# Patient Record
Sex: MALE | Race: WHITE
[De-identification: names, ages, dates, MRNs, and addresses within clinical notes are randomized per-mention and may not be internally consistent; named-entity substitution may affect disease eponyms.]

---

## 2017-01-05 PROBLEM — Z00.00 ENCOUNTER FOR PREVENTIVE HEALTH EXAMINATION: Status: ACTIVE | Noted: 2017-01-05

## 2017-03-08 ENCOUNTER — TRANSCRIPTION ENCOUNTER (OUTPATIENT)
Age: 37
End: 2017-03-08

## 2017-04-24 ENCOUNTER — RESULT REVIEW (OUTPATIENT)
Age: 37
End: 2017-04-24

## 2017-04-25 ENCOUNTER — OUTPATIENT (OUTPATIENT)
Dept: OUTPATIENT SERVICES | Facility: HOSPITAL | Age: 37
LOS: 1 days | Discharge: HOME | End: 2017-04-25
Payer: OTHER GOVERNMENT

## 2017-04-25 VITALS
SYSTOLIC BLOOD PRESSURE: 131 MMHG | RESPIRATION RATE: 17 BRPM | WEIGHT: 259.93 LBS | TEMPERATURE: 98 F | HEART RATE: 86 BPM | OXYGEN SATURATION: 97 % | HEIGHT: 73 IN | DIASTOLIC BLOOD PRESSURE: 81 MMHG

## 2017-04-25 DIAGNOSIS — Z98.890 OTHER SPECIFIED POSTPROCEDURAL STATES: Chronic | ICD-10-CM

## 2017-04-25 PROCEDURE — 88312 SPECIAL STAINS GROUP 1: CPT | Mod: 26

## 2017-04-25 PROCEDURE — 88305 TISSUE EXAM BY PATHOLOGIST: CPT | Mod: 26

## 2017-04-25 PROCEDURE — 88313 SPECIAL STAINS GROUP 2: CPT | Mod: 26

## 2017-04-25 RX ORDER — SODIUM CHLORIDE 9 MG/ML
1000 INJECTION INTRAMUSCULAR; INTRAVENOUS; SUBCUTANEOUS
Qty: 0 | Refills: 0 | Status: DISCONTINUED | OUTPATIENT
Start: 2017-04-25 | End: 2017-05-10

## 2017-04-25 NOTE — ASU PATIENT PROFILE, ADULT - PMH
Asthma  childhood - pt states does not have aymore  Pancreatitis due to common bile duct stone  January 2017  Pneumonia  1 month ago

## 2017-04-26 LAB — SURGICAL PATHOLOGY FINAL REPORT - CH: SIGNIFICANT CHANGE UP

## 2017-04-27 ENCOUNTER — EMERGENCY (EMERGENCY)
Facility: HOSPITAL | Age: 37
LOS: 0 days | Discharge: HOME | End: 2017-04-28
Attending: EMERGENCY MEDICINE | Admitting: EMERGENCY MEDICINE
Payer: OTHER GOVERNMENT

## 2017-04-27 VITALS
DIASTOLIC BLOOD PRESSURE: 102 MMHG | TEMPERATURE: 99 F | RESPIRATION RATE: 18 BRPM | HEART RATE: 95 BPM | SYSTOLIC BLOOD PRESSURE: 148 MMHG | WEIGHT: 255.07 LBS | OXYGEN SATURATION: 99 %

## 2017-04-27 DIAGNOSIS — R10.13 EPIGASTRIC PAIN: ICD-10-CM

## 2017-04-27 DIAGNOSIS — Z98.890 OTHER SPECIFIED POSTPROCEDURAL STATES: Chronic | ICD-10-CM

## 2017-04-27 DIAGNOSIS — K29.50 UNSPECIFIED CHRONIC GASTRITIS WITHOUT BLEEDING: ICD-10-CM

## 2017-04-27 LAB
ALBUMIN SERPL ELPH-MCNC: 4.1 G/DL — SIGNIFICANT CHANGE UP (ref 3.3–5)
ALP SERPL-CCNC: 67 U/L — SIGNIFICANT CHANGE UP (ref 40–120)
ALT FLD-CCNC: 32 U/L — SIGNIFICANT CHANGE UP (ref 12–78)
ANION GAP SERPL CALC-SCNC: 7 MMOL/L — SIGNIFICANT CHANGE UP (ref 5–17)
APPEARANCE UR: CLEAR — SIGNIFICANT CHANGE UP
AST SERPL-CCNC: 17 U/L — SIGNIFICANT CHANGE UP (ref 15–37)
BASOPHILS # BLD AUTO: 0.1 K/UL — SIGNIFICANT CHANGE UP (ref 0–0.2)
BASOPHILS NFR BLD AUTO: 1.4 % — SIGNIFICANT CHANGE UP (ref 0–2)
BILIRUB SERPL-MCNC: 0.2 MG/DL — SIGNIFICANT CHANGE UP (ref 0.2–1.2)
BILIRUB UR-MCNC: NEGATIVE — SIGNIFICANT CHANGE UP
BUN SERPL-MCNC: 12 MG/DL — SIGNIFICANT CHANGE UP (ref 7–23)
CALCIUM SERPL-MCNC: 9.4 MG/DL — SIGNIFICANT CHANGE UP (ref 8.5–10.1)
CHLORIDE SERPL-SCNC: 105 MMOL/L — SIGNIFICANT CHANGE UP (ref 96–108)
CO2 SERPL-SCNC: 28 MMOL/L — SIGNIFICANT CHANGE UP (ref 22–31)
COLOR SPEC: YELLOW — SIGNIFICANT CHANGE UP
CREAT SERPL-MCNC: 1.01 MG/DL — SIGNIFICANT CHANGE UP (ref 0.5–1.3)
DIFF PNL FLD: NEGATIVE — SIGNIFICANT CHANGE UP
EOSINOPHIL # BLD AUTO: 0.2 K/UL — SIGNIFICANT CHANGE UP (ref 0–0.5)
EOSINOPHIL NFR BLD AUTO: 2.8 % — SIGNIFICANT CHANGE UP (ref 0–6)
GLUCOSE SERPL-MCNC: 137 MG/DL — HIGH (ref 70–99)
GLUCOSE UR QL: NEGATIVE MG/DL — SIGNIFICANT CHANGE UP
HCT VFR BLD CALC: 40.5 % — SIGNIFICANT CHANGE UP (ref 39–50)
HGB BLD-MCNC: 14.3 G/DL — SIGNIFICANT CHANGE UP (ref 13–17)
KETONES UR-MCNC: NEGATIVE — SIGNIFICANT CHANGE UP
LEUKOCYTE ESTERASE UR-ACNC: NEGATIVE — SIGNIFICANT CHANGE UP
LIDOCAIN IGE QN: 64 U/L — LOW (ref 73–393)
LYMPHOCYTES # BLD AUTO: 3 K/UL — SIGNIFICANT CHANGE UP (ref 1–3.3)
LYMPHOCYTES # BLD AUTO: 40.5 % — SIGNIFICANT CHANGE UP (ref 13–44)
MCHC RBC-ENTMCNC: 31.1 PG — SIGNIFICANT CHANGE UP (ref 27–34)
MCHC RBC-ENTMCNC: 35.3 GM/DL — SIGNIFICANT CHANGE UP (ref 32–36)
MCV RBC AUTO: 88 FL — SIGNIFICANT CHANGE UP (ref 80–100)
MONOCYTES # BLD AUTO: 0.5 K/UL — SIGNIFICANT CHANGE UP (ref 0–0.9)
MONOCYTES NFR BLD AUTO: 6.3 % — SIGNIFICANT CHANGE UP (ref 2–14)
NEUTROPHILS # BLD AUTO: 3.7 K/UL — SIGNIFICANT CHANGE UP (ref 1.8–7.4)
NEUTROPHILS NFR BLD AUTO: 49 % — SIGNIFICANT CHANGE UP (ref 43–77)
NITRITE UR-MCNC: NEGATIVE — SIGNIFICANT CHANGE UP
PH UR: 6 — SIGNIFICANT CHANGE UP (ref 5–8)
PLATELET # BLD AUTO: 263 K/UL — SIGNIFICANT CHANGE UP (ref 150–400)
POTASSIUM SERPL-MCNC: 3.9 MMOL/L — SIGNIFICANT CHANGE UP (ref 3.5–5.3)
POTASSIUM SERPL-SCNC: 3.9 MMOL/L — SIGNIFICANT CHANGE UP (ref 3.5–5.3)
PROT SERPL-MCNC: 7.7 GM/DL — SIGNIFICANT CHANGE UP (ref 6–8.3)
PROT UR-MCNC: NEGATIVE MG/DL — SIGNIFICANT CHANGE UP
RBC # BLD: 4.6 M/UL — SIGNIFICANT CHANGE UP (ref 4.2–5.8)
RBC # FLD: 11.8 % — SIGNIFICANT CHANGE UP (ref 10.3–14.5)
SODIUM SERPL-SCNC: 140 MMOL/L — SIGNIFICANT CHANGE UP (ref 135–145)
SP GR SPEC: 1.01 — SIGNIFICANT CHANGE UP (ref 1.01–1.02)
UROBILINOGEN FLD QL: NEGATIVE MG/DL — SIGNIFICANT CHANGE UP
WBC # BLD: 7.5 K/UL — SIGNIFICANT CHANGE UP (ref 3.8–10.5)
WBC # FLD AUTO: 7.5 K/UL — SIGNIFICANT CHANGE UP (ref 3.8–10.5)

## 2017-04-27 PROCEDURE — 71020: CPT | Mod: 26

## 2017-04-27 PROCEDURE — 76700 US EXAM ABDOM COMPLETE: CPT | Mod: 26

## 2017-04-27 PROCEDURE — 99053 MED SERV 10PM-8AM 24 HR FAC: CPT

## 2017-04-27 PROCEDURE — 99284 EMERGENCY DEPT VISIT MOD MDM: CPT | Mod: 25

## 2017-04-27 RX ORDER — SODIUM CHLORIDE 9 MG/ML
1000 INJECTION INTRAMUSCULAR; INTRAVENOUS; SUBCUTANEOUS ONCE
Qty: 0 | Refills: 0 | Status: COMPLETED | OUTPATIENT
Start: 2017-04-27 | End: 2017-04-27

## 2017-04-27 RX ORDER — MORPHINE SULFATE 50 MG/1
6 CAPSULE, EXTENDED RELEASE ORAL ONCE
Qty: 0 | Refills: 0 | Status: DISCONTINUED | OUTPATIENT
Start: 2017-04-27 | End: 2017-04-27

## 2017-04-27 RX ORDER — MORPHINE SULFATE 50 MG/1
4 CAPSULE, EXTENDED RELEASE ORAL ONCE
Qty: 0 | Refills: 0 | Status: DISCONTINUED | OUTPATIENT
Start: 2017-04-27 | End: 2017-04-27

## 2017-04-27 RX ORDER — ONDANSETRON 8 MG/1
4 TABLET, FILM COATED ORAL ONCE
Qty: 0 | Refills: 0 | Status: COMPLETED | OUTPATIENT
Start: 2017-04-27 | End: 2017-04-27

## 2017-04-27 RX ORDER — KETOROLAC TROMETHAMINE 30 MG/ML
30 SYRINGE (ML) INJECTION ONCE
Qty: 0 | Refills: 0 | Status: DISCONTINUED | OUTPATIENT
Start: 2017-04-27 | End: 2017-04-27

## 2017-04-27 RX ORDER — SODIUM CHLORIDE 9 MG/ML
3 INJECTION INTRAMUSCULAR; INTRAVENOUS; SUBCUTANEOUS ONCE
Qty: 0 | Refills: 0 | Status: COMPLETED | OUTPATIENT
Start: 2017-04-27 | End: 2017-04-27

## 2017-04-27 RX ORDER — SUCRALFATE 1 G
1 TABLET ORAL ONCE
Qty: 0 | Refills: 0 | Status: COMPLETED | OUTPATIENT
Start: 2017-04-27 | End: 2017-04-27

## 2017-04-27 RX ADMIN — Medication 30 MILLIGRAM(S): at 23:34

## 2017-04-27 RX ADMIN — Medication 1 GRAM(S): at 23:20

## 2017-04-27 RX ADMIN — SODIUM CHLORIDE 1000 MILLILITER(S): 9 INJECTION INTRAMUSCULAR; INTRAVENOUS; SUBCUTANEOUS at 21:45

## 2017-04-27 RX ADMIN — MORPHINE SULFATE 4 MILLIGRAM(S): 50 CAPSULE, EXTENDED RELEASE ORAL at 22:27

## 2017-04-27 RX ADMIN — Medication 30 MILLIGRAM(S): at 22:27

## 2017-04-27 RX ADMIN — ONDANSETRON 4 MILLIGRAM(S): 8 TABLET, FILM COATED ORAL at 21:45

## 2017-04-27 RX ADMIN — SODIUM CHLORIDE 3 MILLILITER(S): 9 INJECTION INTRAMUSCULAR; INTRAVENOUS; SUBCUTANEOUS at 21:45

## 2017-04-27 RX ADMIN — MORPHINE SULFATE 4 MILLIGRAM(S): 50 CAPSULE, EXTENDED RELEASE ORAL at 21:45

## 2017-04-27 RX ADMIN — MORPHINE SULFATE 6 MILLIGRAM(S): 50 CAPSULE, EXTENDED RELEASE ORAL at 23:34

## 2017-04-27 NOTE — ED STATDOCS - GASTROINTESTINAL, MLM
+RLQ tenderness. abdomen soft, non-distended. Bowel sounds present. +epigastric, RLQ tenderness. abdomen soft, non-distended. Bowel sounds present. negative dumont's, no CVAT.

## 2017-04-27 NOTE — ED STATDOCS - OBJECTIVE STATEMENT
37 yo M with hx of HTN, pancreatitis in December, kidney stones, gallstones, presents for flank pain, abd pain, nausea. Pt states it feels like a kidney stone. Pain is worse with eating. Denies diarrhea. He had a recent follow-up EUS showing gallstones and sludge. Pt recently finished steroids for shoulder pain and z-misael for pneumonia diagnosed 2 weeks ago. GI Dr. Rodriguez.

## 2017-04-27 NOTE — ED STATDOCS - MEDICAL DECISION MAKING DETAILS
US to r/o acute rey and CT to r/o kidney stones US to r/o acute rey and CT to r/o kidney stones planned with discharge to see his doctors if these studies are negative and if pain is controlled

## 2017-04-27 NOTE — ED STATDOCS - CARE PLAN
Principal Discharge DX:	Epigastric pain  Secondary Diagnosis:	Chronic gastritis without bleeding, unspecified gastritis type

## 2017-04-27 NOTE — ED STATDOCS - PROGRESS NOTE DETAILS
Patients pain improved, walking around the ER; will contact his GI doctors tomorrow for outpatient management and evaluation.

## 2017-04-27 NOTE — ED STATDOCS - NS ED MD SCRIBE ATTENDING SCRIBE SECTIONS
PAST MEDICAL/SURGICAL/SOCIAL HISTORY/PHYSICAL EXAM/DISPOSITION/HISTORY OF PRESENT ILLNESS/RESULTS/REVIEW OF SYSTEMS/PROGRESS NOTE

## 2017-04-27 NOTE — ED STATDOCS - DETAILS:
I, Cammy Flores, performed the initial face to face bedside interview with this patient regarding history of present illness, review of symptoms and relevant past medical, social and family history.  I completed an independent physical examination.  I was the initial provider who evaluated this patient.   The history, relevant review of systems, past medical and surgical history, medical decision making, and physical examination was documented by the scribe in my presence and I attest to the accuracy of the documentation.

## 2017-04-27 NOTE — ED ADULT NURSE NOTE - OBJECTIVE STATEMENT
Pt presents to ED c/o right flank pain, nausea and chills x 1 day. Pt has hx of kidney stones and gallstones. Denies burning on urination, blood in urine, and vomiting. Examined by MD Flores. Will continue to monitor.

## 2017-04-28 PROCEDURE — 74176 CT ABD & PELVIS W/O CONTRAST: CPT | Mod: 26

## 2017-05-03 DIAGNOSIS — K22.8 OTHER SPECIFIED DISEASES OF ESOPHAGUS: ICD-10-CM

## 2017-05-03 DIAGNOSIS — Z87.891 PERSONAL HISTORY OF NICOTINE DEPENDENCE: ICD-10-CM

## 2017-05-03 DIAGNOSIS — K85.90 ACUTE PANCREATITIS WITHOUT NECROSIS OR INFECTION, UNSPECIFIED: ICD-10-CM

## 2017-05-03 DIAGNOSIS — E78.1 PURE HYPERGLYCERIDEMIA: ICD-10-CM

## 2017-05-03 DIAGNOSIS — I10 ESSENTIAL (PRIMARY) HYPERTENSION: ICD-10-CM

## 2017-05-03 DIAGNOSIS — E78.5 HYPERLIPIDEMIA, UNSPECIFIED: ICD-10-CM

## 2017-05-03 DIAGNOSIS — K29.70 GASTRITIS, UNSPECIFIED, WITHOUT BLEEDING: ICD-10-CM

## 2017-05-03 DIAGNOSIS — K20.8 OTHER ESOPHAGITIS: ICD-10-CM

## 2017-10-23 RX ORDER — AMOXICILLIN 250 MG/5ML
1 SUSPENSION, RECONSTITUTED, ORAL (ML) ORAL
Qty: 0 | Refills: 0 | COMMUNITY
Start: 2017-10-23 | End: 2017-11-01

## 2017-11-03 ENCOUNTER — INPATIENT (INPATIENT)
Facility: HOSPITAL | Age: 37
LOS: 1 days | Discharge: HOME | End: 2017-11-05
Attending: FAMILY MEDICINE | Admitting: FAMILY MEDICINE
Payer: OTHER GOVERNMENT

## 2017-11-03 VITALS — HEIGHT: 73 IN | WEIGHT: 270.07 LBS

## 2017-11-03 DIAGNOSIS — Z98.890 OTHER SPECIFIED POSTPROCEDURAL STATES: Chronic | ICD-10-CM

## 2017-11-03 LAB
ADD ON TEST-SPECIMEN IN LAB: SIGNIFICANT CHANGE UP
ALBUMIN SERPL ELPH-MCNC: 3.6 G/DL — SIGNIFICANT CHANGE UP (ref 3.3–5)
ALP SERPL-CCNC: 142 U/L — HIGH (ref 40–120)
ALT FLD-CCNC: 918 U/L — HIGH (ref 12–78)
ANION GAP SERPL CALC-SCNC: 13 MMOL/L — SIGNIFICANT CHANGE UP (ref 5–17)
APPEARANCE UR: CLEAR — SIGNIFICANT CHANGE UP
APTT BLD: 30.3 SEC — SIGNIFICANT CHANGE UP (ref 27.5–37.4)
AST SERPL-CCNC: 741 U/L — HIGH (ref 15–37)
BACTERIA # UR AUTO: NEGATIVE — SIGNIFICANT CHANGE UP
BASOPHILS # BLD AUTO: 0.1 K/UL — SIGNIFICANT CHANGE UP (ref 0–0.2)
BASOPHILS NFR BLD AUTO: 0.5 % — SIGNIFICANT CHANGE UP (ref 0–2)
BILIRUB SERPL-MCNC: 1.7 MG/DL — HIGH (ref 0.2–1.2)
BILIRUB UR-MCNC: NEGATIVE — SIGNIFICANT CHANGE UP
BLD GP AB SCN SERPL QL: SIGNIFICANT CHANGE UP
BUN SERPL-MCNC: 14 MG/DL — SIGNIFICANT CHANGE UP (ref 7–23)
CALCIUM SERPL-MCNC: 8.9 MG/DL — SIGNIFICANT CHANGE UP (ref 8.5–10.1)
CHLORIDE SERPL-SCNC: 96 MMOL/L — SIGNIFICANT CHANGE UP (ref 96–108)
CO2 SERPL-SCNC: 21 MMOL/L — LOW (ref 22–31)
COLOR SPEC: YELLOW — SIGNIFICANT CHANGE UP
CREAT SERPL-MCNC: 1.03 MG/DL — SIGNIFICANT CHANGE UP (ref 0.5–1.3)
DIFF PNL FLD: NEGATIVE — SIGNIFICANT CHANGE UP
EOSINOPHIL # BLD AUTO: 0 K/UL — SIGNIFICANT CHANGE UP (ref 0–0.5)
EOSINOPHIL NFR BLD AUTO: 0.3 % — SIGNIFICANT CHANGE UP (ref 0–6)
EPI CELLS # UR: SIGNIFICANT CHANGE UP
GLUCOSE SERPL-MCNC: 125 MG/DL — HIGH (ref 70–99)
GLUCOSE UR QL: NEGATIVE MG/DL — SIGNIFICANT CHANGE UP
HCT VFR BLD CALC: 48.2 % — SIGNIFICANT CHANGE UP (ref 39–50)
HGB BLD-MCNC: 16.4 G/DL — SIGNIFICANT CHANGE UP (ref 13–17)
INR BLD: 1.13 RATIO — SIGNIFICANT CHANGE UP (ref 0.88–1.16)
KETONES UR-MCNC: NEGATIVE — SIGNIFICANT CHANGE UP
LEUKOCYTE ESTERASE UR-ACNC: NEGATIVE — SIGNIFICANT CHANGE UP
LIDOCAIN IGE QN: 414 U/L — HIGH (ref 73–393)
LYMPHOCYTES # BLD AUTO: 17.4 % — SIGNIFICANT CHANGE UP (ref 13–44)
LYMPHOCYTES # BLD AUTO: 2.7 K/UL — SIGNIFICANT CHANGE UP (ref 1–3.3)
MCHC RBC-ENTMCNC: 33.1 PG — SIGNIFICANT CHANGE UP (ref 27–34)
MCHC RBC-ENTMCNC: 33.9 GM/DL — SIGNIFICANT CHANGE UP (ref 32–36)
MCV RBC AUTO: 97.5 FL — SIGNIFICANT CHANGE UP (ref 80–100)
MONOCYTES # BLD AUTO: 0.8 K/UL — SIGNIFICANT CHANGE UP (ref 0–0.9)
MONOCYTES NFR BLD AUTO: 5.1 % — SIGNIFICANT CHANGE UP (ref 2–14)
NEUTROPHILS # BLD AUTO: 12 K/UL — HIGH (ref 1.8–7.4)
NEUTROPHILS NFR BLD AUTO: 76.8 % — SIGNIFICANT CHANGE UP (ref 43–77)
NITRITE UR-MCNC: NEGATIVE — SIGNIFICANT CHANGE UP
PH UR: 6.5 — SIGNIFICANT CHANGE UP (ref 5–8)
PLATELET # BLD AUTO: 277 K/UL — SIGNIFICANT CHANGE UP (ref 150–400)
POTASSIUM SERPL-MCNC: 4.3 MMOL/L — SIGNIFICANT CHANGE UP (ref 3.5–5.3)
POTASSIUM SERPL-SCNC: 4.3 MMOL/L — SIGNIFICANT CHANGE UP (ref 3.5–5.3)
PROT SERPL-MCNC: 7.6 GM/DL — SIGNIFICANT CHANGE UP (ref 6–8.3)
PROT UR-MCNC: 15 MG/DL
PROTHROM AB SERPL-ACNC: 12.2 SEC — SIGNIFICANT CHANGE UP (ref 9.8–12.7)
RBC # BLD: 4.94 M/UL — SIGNIFICANT CHANGE UP (ref 4.2–5.8)
RBC # FLD: 13.4 % — SIGNIFICANT CHANGE UP (ref 10.3–14.5)
RBC CASTS # UR COMP ASSIST: NEGATIVE /HPF — SIGNIFICANT CHANGE UP (ref 0–4)
SODIUM SERPL-SCNC: 130 MMOL/L — LOW (ref 135–145)
SP GR SPEC: 1.01 — SIGNIFICANT CHANGE UP (ref 1.01–1.02)
TYPE + AB SCN PNL BLD: SIGNIFICANT CHANGE UP
UROBILINOGEN FLD QL: 1 MG/DL
WBC # BLD: 15.6 K/UL — HIGH (ref 3.8–10.5)
WBC # FLD AUTO: 15.6 K/UL — HIGH (ref 3.8–10.5)
WBC UR QL: SIGNIFICANT CHANGE UP

## 2017-11-03 PROCEDURE — 99285 EMERGENCY DEPT VISIT HI MDM: CPT

## 2017-11-03 PROCEDURE — 76705 ECHO EXAM OF ABDOMEN: CPT | Mod: 26

## 2017-11-03 PROCEDURE — 93010 ELECTROCARDIOGRAM REPORT: CPT

## 2017-11-03 PROCEDURE — 74177 CT ABD & PELVIS W/CONTRAST: CPT | Mod: 26

## 2017-11-03 RX ORDER — METOPROLOL TARTRATE 50 MG
1 TABLET ORAL
Qty: 0 | Refills: 0 | COMMUNITY

## 2017-11-03 RX ORDER — SODIUM CHLORIDE 9 MG/ML
3 INJECTION INTRAMUSCULAR; INTRAVENOUS; SUBCUTANEOUS ONCE
Qty: 0 | Refills: 0 | Status: COMPLETED | OUTPATIENT
Start: 2017-11-03 | End: 2017-11-03

## 2017-11-03 RX ORDER — GEMFIBROZIL 600 MG
1 TABLET ORAL
Qty: 0 | Refills: 0 | COMMUNITY

## 2017-11-03 RX ORDER — FLUTICASONE PROPIONATE 50 MCG
1 SPRAY, SUSPENSION NASAL
Qty: 0 | Refills: 0 | COMMUNITY

## 2017-11-03 RX ORDER — MORPHINE SULFATE 50 MG/1
4 CAPSULE, EXTENDED RELEASE ORAL ONCE
Qty: 0 | Refills: 0 | Status: DISCONTINUED | OUTPATIENT
Start: 2017-11-03 | End: 2017-11-03

## 2017-11-03 RX ORDER — SODIUM CHLORIDE 9 MG/ML
3000 INJECTION INTRAMUSCULAR; INTRAVENOUS; SUBCUTANEOUS
Qty: 0 | Refills: 0 | Status: DISCONTINUED | OUTPATIENT
Start: 2017-11-03 | End: 2017-11-04

## 2017-11-03 RX ORDER — AMLODIPINE BESYLATE 2.5 MG/1
1 TABLET ORAL
Qty: 0 | Refills: 0 | COMMUNITY

## 2017-11-03 RX ORDER — THIAMINE MONONITRATE (VIT B1) 100 MG
100 TABLET ORAL DAILY
Qty: 0 | Refills: 0 | Status: DISCONTINUED | OUTPATIENT
Start: 2017-11-03 | End: 2017-11-05

## 2017-11-03 RX ORDER — HYDROMORPHONE HYDROCHLORIDE 2 MG/ML
1 INJECTION INTRAMUSCULAR; INTRAVENOUS; SUBCUTANEOUS ONCE
Qty: 0 | Refills: 0 | Status: DISCONTINUED | OUTPATIENT
Start: 2017-11-03 | End: 2017-11-03

## 2017-11-03 RX ORDER — HEPARIN SODIUM 5000 [USP'U]/ML
5000 INJECTION INTRAVENOUS; SUBCUTANEOUS EVERY 12 HOURS
Qty: 0 | Refills: 0 | Status: DISCONTINUED | OUTPATIENT
Start: 2017-11-03 | End: 2017-11-05

## 2017-11-03 RX ORDER — FOLIC ACID 0.8 MG
1 TABLET ORAL DAILY
Qty: 0 | Refills: 0 | Status: DISCONTINUED | OUTPATIENT
Start: 2017-11-03 | End: 2017-11-05

## 2017-11-03 RX ORDER — ALPRAZOLAM 0.25 MG
1 TABLET ORAL
Qty: 4 | Refills: 0 | OUTPATIENT
Start: 2017-11-03

## 2017-11-03 RX ORDER — GUAIFENESIN/PHENYLPROPANOLAMIN
450 EXPECTORANT ORAL
Qty: 0 | Refills: 0 | COMMUNITY

## 2017-11-03 RX ORDER — SODIUM CHLORIDE 9 MG/ML
1000 INJECTION INTRAMUSCULAR; INTRAVENOUS; SUBCUTANEOUS ONCE
Qty: 0 | Refills: 0 | Status: COMPLETED | OUTPATIENT
Start: 2017-11-03 | End: 2017-11-03

## 2017-11-03 RX ORDER — ONDANSETRON 8 MG/1
4 TABLET, FILM COATED ORAL EVERY 6 HOURS
Qty: 0 | Refills: 0 | Status: DISCONTINUED | OUTPATIENT
Start: 2017-11-03 | End: 2017-11-05

## 2017-11-03 RX ORDER — PANTOPRAZOLE SODIUM 20 MG/1
1 TABLET, DELAYED RELEASE ORAL
Qty: 0 | Refills: 0 | COMMUNITY

## 2017-11-03 RX ADMIN — MORPHINE SULFATE 4 MILLIGRAM(S): 50 CAPSULE, EXTENDED RELEASE ORAL at 17:48

## 2017-11-03 RX ADMIN — HYDROMORPHONE HYDROCHLORIDE 1 MILLIGRAM(S): 2 INJECTION INTRAMUSCULAR; INTRAVENOUS; SUBCUTANEOUS at 21:45

## 2017-11-03 RX ADMIN — MORPHINE SULFATE 4 MILLIGRAM(S): 50 CAPSULE, EXTENDED RELEASE ORAL at 19:00

## 2017-11-03 RX ADMIN — SODIUM CHLORIDE 3 MILLILITER(S): 9 INJECTION INTRAMUSCULAR; INTRAVENOUS; SUBCUTANEOUS at 17:50

## 2017-11-03 RX ADMIN — SODIUM CHLORIDE 1000 MILLILITER(S): 9 INJECTION INTRAMUSCULAR; INTRAVENOUS; SUBCUTANEOUS at 17:50

## 2017-11-03 RX ADMIN — SODIUM CHLORIDE 1000 MILLILITER(S): 9 INJECTION INTRAMUSCULAR; INTRAVENOUS; SUBCUTANEOUS at 21:42

## 2017-11-03 NOTE — ED STATDOCS - PROGRESS NOTE DETAILS
BENI Ho:   Patient has been seen, evaluated and orders have been written by the attending in intake. Patient is stable.  I will follow up the results of orders written and I will continue to evaluate/observe the patient.  Valerie oH PA-C

## 2017-11-03 NOTE — H&P ADULT - PMH
Asthma  childhood - pt states does not have aymore  HTN (hypertension)    Pancreatitis due to common bile duct stone  January 2017  Pneumonia  1 month ago

## 2017-11-03 NOTE — ED STATDOCS - ATTENDING CONTRIBUTION TO CARE
I, Ivan Manuel MD,  performed the initial face to face bedside interview with this patient regarding history of present illness, review of symptoms and relevant past medical, social and family history.  I completed an independent physical examination.  I was the initial provider who evaluated this patient. I have signed out the follow up of any pending tests (i.e. labs, radiological studies) to the ACP.  I have communicated the patient’s plan of care and disposition with the ACP.  The history, relevant review of systems, past medical and surgical history, medical decision making, and physical examination was documented by the scribe in my presence and I attest to the accuracy of the documentation.

## 2017-11-03 NOTE — H&P ADULT - NSHPSOCIALHISTORY_GEN_ALL_CORE
Pt uses chewing tobacco 1-2 x /day approx  3 Packs/tobacco/week.    Smoked 10 pack years until 2015.  3-4 beers on weekend days.  Denies drug use. Pt works at the [employer]. He lives with his wife and children.

## 2017-11-03 NOTE — ED STATDOCS - NS_ ATTENDINGSCRIBEDETAILS _ED_A_ED_FT
I, Ivan Manuel MD,  performed the initial face to face bedside interview with this patient regarding history of present illness, review of symptoms and relevant past medical, social and family history.  I completed an independent physical examination.    The history, relevant review of systems, past medical and surgical history, medical decision making, and physical examination was documented by the scribe in my presence and I attest to the accuracy of the documentation.

## 2017-11-03 NOTE — H&P ADULT - NSHPPHYSICALEXAM_GEN_ALL_CORE
ICU Vital Signs Last 24 Hrs    T(F): 98.1 (03 Nov 2017 17:08), Max: 98.1 (03 Nov 2017 17:08)    HR: 109 (03 Nov 2017 17:08) (109 - 109)    RR: 19    SpO2: 97%

## 2017-11-03 NOTE — H&P ADULT - HISTORY OF PRESENT ILLNESS
38 y/o M w/ pmhx of pancreatitis and HTN presents to ED c/o "gas pain" starting last night, states now he has abd pain radiating to his back. Pt states pain feels different than pancreatitis. Unable to tolerate any PO intake. No pshx of abd surgeries, denies fevers, hematuria or any other acute complaints. 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   He reports being unable to tolerate oral intake/decreased appetite since the night before.  He had a protein shake this morning at 8am, however at 2:30pm today he had a protein shake and felt severe abd pain.  He denies ETOH for the last 3 days,  he did drink 3-4 beers Sat and Sunday,  he reports he drinks this much only on the weekends.       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

## 2017-11-03 NOTE — ED ADULT TRIAGE NOTE - CHIEF COMPLAINT QUOTE
Pt. to the ED C/O RUQ and middle abdominal pain with CP- Pt. states he has hx of HTN and Pancreatitis

## 2017-11-03 NOTE — ED ADULT NURSE REASSESSMENT NOTE - NS ED NURSE REASSESS COMMENT FT1
spoke to Dr. Tate regarding ordering PRN pain medication. Dr. Tate will see pt prior to ordering any pain medication. pt report given to RN on 5E. pt stable for transport.

## 2017-11-03 NOTE — ED STATDOCS - OBJECTIVE STATEMENT
38 y/o M w/ pmhx of pancreatitis and HTN presents to ED c/o "gas pain" starting last night, states now he has abd pain radiating to his back. Pt states pain feels different than pancreatitis. Unable to tolerate any PO intake. No pshx of abd surgeries, denies fevers, hematuria or any other acute complaints. PMD Dr. Alvarado.

## 2017-11-03 NOTE — H&P ADULT - ASSESSMENT
Pt is admitted w/    I. Pancreatitis  - IVF  - NPO  - GI consult    II. Alcoholism  - CIWA protocol  - Social work consult    III. 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   He reports being unable to tolerate oral intake/decreased appetite since the night before.  He had a protein shake this morning at 8am, however at 2:30pm today he had a protein shake and felt severe abd pain.  He denies ETOH for the last 3 days,  he did drink 3-4 beers Sat and Sunday,  he reports he drinks this much only on the weekends.      Pt is admitted w/    I. Pancreatitis, fatty liver on US also noted  - IVF, pt received NS 2 L in the ER, will cont fluids  - AM lipase, LFTS, BMP  - lipid panel/triglycerides  - NPO  - pain control  - GI consult Dr. Asencio on call    II. Alcoholism? vs intermittent ETOH use  - CIWA protocol  - Social work consult    III. 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   He reports being unable to tolerate oral intake/decreased appetite since the night before.  He had a protein shake this morning at 8am, however at 2:30pm today he had a protein shake and felt severe abd pain.  He denies ETOH for the last 3 days,  he did drink 3-4 beers Sat and Sunday,  he reports he drinks this much only on the weekends.      Pt is admitted w/    I. Pancreatitis, fatty liver on US also noted,  infl of pancreas noted (also decreased size of surrounding fluid collection)  - IVF, pt received NS 2 L in the ER, will cont fluids  - AM lipase, LFTS, BMP  - lipid panel/triglycerides  - NPO  - pain control  - GI consult Dr. Asencio on call    II. Alcoholism? vs intermittent ETOH use  - CIWA protocol  - Social work consult    III. Leukocytosis noted.  Due to Pancreatitis? Recent Bronchitis/Laryngitis/Sinusitis  - completed course of Augmentin, has been on Doxy for few days, cont for now  - Add Rocephin  - blood cx    IV. DVT Prophylaxis  - heparin

## 2017-11-04 LAB
ALBUMIN SERPL ELPH-MCNC: 2.9 G/DL — LOW (ref 3.3–5)
ALP SERPL-CCNC: 107 U/L — SIGNIFICANT CHANGE UP (ref 40–120)
ALT FLD-CCNC: 607 U/L — HIGH (ref 12–78)
ANION GAP SERPL CALC-SCNC: 9 MMOL/L — SIGNIFICANT CHANGE UP (ref 5–17)
AST SERPL-CCNC: 400 U/L — HIGH (ref 15–37)
BILIRUB DIRECT SERPL-MCNC: 0.8 MG/DL — HIGH (ref 0–0.2)
BILIRUB INDIRECT FLD-MCNC: 0.9 MG/DL — SIGNIFICANT CHANGE UP (ref 0.2–1)
BILIRUB SERPL-MCNC: 1.7 MG/DL — HIGH (ref 0.2–1.2)
BUN SERPL-MCNC: 7 MG/DL — SIGNIFICANT CHANGE UP (ref 7–23)
CALCIUM SERPL-MCNC: 8 MG/DL — LOW (ref 8.5–10.1)
CHLORIDE SERPL-SCNC: 103 MMOL/L — SIGNIFICANT CHANGE UP (ref 96–108)
CHOLEST SERPL-MCNC: 143 MG/DL — SIGNIFICANT CHANGE UP (ref 10–199)
CO2 SERPL-SCNC: 24 MMOL/L — SIGNIFICANT CHANGE UP (ref 22–31)
CREAT SERPL-MCNC: 0.73 MG/DL — SIGNIFICANT CHANGE UP (ref 0.5–1.3)
GLUCOSE SERPL-MCNC: 115 MG/DL — HIGH (ref 70–99)
HDLC SERPL-MCNC: 16 MG/DL — LOW (ref 40–125)
LIDOCAIN IGE QN: 285 U/L — SIGNIFICANT CHANGE UP (ref 73–393)
LIPID PNL WITH DIRECT LDL SERPL: SIGNIFICANT CHANGE UP MG/DL
MAGNESIUM SERPL-MCNC: 2.1 MG/DL — SIGNIFICANT CHANGE UP (ref 1.6–2.6)
PHOSPHATE SERPL-MCNC: 1.9 MG/DL — LOW (ref 2.5–4.5)
POTASSIUM SERPL-MCNC: 3.4 MMOL/L — LOW (ref 3.5–5.3)
POTASSIUM SERPL-SCNC: 3.4 MMOL/L — LOW (ref 3.5–5.3)
PROT SERPL-MCNC: 6.1 GM/DL — SIGNIFICANT CHANGE UP (ref 6–8.3)
SODIUM SERPL-SCNC: 136 MMOL/L — SIGNIFICANT CHANGE UP (ref 135–145)
TOTAL CHOLESTEROL/HDL RATIO MEASUREMENT: 8.9 RATIO — SIGNIFICANT CHANGE UP (ref 3.4–9.6)
TRIGL SERPL-MCNC: 486 MG/DL — HIGH (ref 10–149)

## 2017-11-04 PROCEDURE — 74182 MRI ABDOMEN W/CONTRAST: CPT | Mod: 26

## 2017-11-04 PROCEDURE — 71010: CPT | Mod: 26

## 2017-11-04 PROCEDURE — 93010 ELECTROCARDIOGRAM REPORT: CPT

## 2017-11-04 RX ORDER — CEFTRIAXONE 500 MG/1
INJECTION, POWDER, FOR SOLUTION INTRAMUSCULAR; INTRAVENOUS
Qty: 0 | Refills: 0 | Status: DISCONTINUED | OUTPATIENT
Start: 2017-11-04 | End: 2017-11-05

## 2017-11-04 RX ORDER — HYDROMORPHONE HYDROCHLORIDE 2 MG/ML
1 INJECTION INTRAMUSCULAR; INTRAVENOUS; SUBCUTANEOUS THREE TIMES A DAY
Qty: 0 | Refills: 0 | Status: DISCONTINUED | OUTPATIENT
Start: 2017-11-04 | End: 2017-11-04

## 2017-11-04 RX ORDER — ALPRAZOLAM 0.25 MG
0.5 TABLET ORAL DAILY
Qty: 0 | Refills: 0 | Status: DISCONTINUED | OUTPATIENT
Start: 2017-11-04 | End: 2017-11-05

## 2017-11-04 RX ORDER — GEMFIBROZIL 600 MG
600 TABLET ORAL
Qty: 0 | Refills: 0 | Status: DISCONTINUED | OUTPATIENT
Start: 2017-11-04 | End: 2017-11-05

## 2017-11-04 RX ORDER — SODIUM,POTASSIUM PHOSPHATES 278-250MG
1 POWDER IN PACKET (EA) ORAL
Qty: 0 | Refills: 0 | Status: COMPLETED | OUTPATIENT
Start: 2017-11-04 | End: 2017-11-05

## 2017-11-04 RX ORDER — MORPHINE SULFATE 50 MG/1
2 CAPSULE, EXTENDED RELEASE ORAL EVERY 6 HOURS
Qty: 0 | Refills: 0 | Status: DISCONTINUED | OUTPATIENT
Start: 2017-11-04 | End: 2017-11-05

## 2017-11-04 RX ORDER — SODIUM CHLORIDE 9 MG/ML
1000 INJECTION INTRAMUSCULAR; INTRAVENOUS; SUBCUTANEOUS ONCE
Qty: 0 | Refills: 0 | Status: COMPLETED | OUTPATIENT
Start: 2017-11-04 | End: 2017-11-04

## 2017-11-04 RX ORDER — METOPROLOL TARTRATE 50 MG
50 TABLET ORAL
Qty: 0 | Refills: 0 | Status: DISCONTINUED | OUTPATIENT
Start: 2017-11-04 | End: 2017-11-05

## 2017-11-04 RX ORDER — CEFTRIAXONE 500 MG/1
1 INJECTION, POWDER, FOR SOLUTION INTRAMUSCULAR; INTRAVENOUS EVERY 24 HOURS
Qty: 0 | Refills: 0 | Status: CANCELLED | OUTPATIENT
Start: 2017-11-05 | End: 2017-11-05

## 2017-11-04 RX ORDER — FLUTICASONE PROPIONATE 50 MCG
1 SPRAY, SUSPENSION NASAL
Qty: 0 | Refills: 0 | Status: DISCONTINUED | OUTPATIENT
Start: 2017-11-04 | End: 2017-11-05

## 2017-11-04 RX ORDER — POTASSIUM CHLORIDE 20 MEQ
40 PACKET (EA) ORAL EVERY 4 HOURS
Qty: 0 | Refills: 0 | Status: COMPLETED | OUTPATIENT
Start: 2017-11-04 | End: 2017-11-04

## 2017-11-04 RX ORDER — AMLODIPINE BESYLATE 2.5 MG/1
10 TABLET ORAL DAILY
Qty: 0 | Refills: 0 | Status: DISCONTINUED | OUTPATIENT
Start: 2017-11-04 | End: 2017-11-05

## 2017-11-04 RX ORDER — SODIUM CHLORIDE 9 MG/ML
1000 INJECTION INTRAMUSCULAR; INTRAVENOUS; SUBCUTANEOUS
Qty: 0 | Refills: 0 | Status: DISCONTINUED | OUTPATIENT
Start: 2017-11-04 | End: 2017-11-05

## 2017-11-04 RX ORDER — HYDROCHLOROTHIAZIDE 25 MG
25 TABLET ORAL DAILY
Qty: 0 | Refills: 0 | Status: DISCONTINUED | OUTPATIENT
Start: 2017-11-04 | End: 2017-11-04

## 2017-11-04 RX ORDER — CEFTRIAXONE 500 MG/1
1 INJECTION, POWDER, FOR SOLUTION INTRAMUSCULAR; INTRAVENOUS ONCE
Qty: 0 | Refills: 0 | Status: COMPLETED | OUTPATIENT
Start: 2017-11-04 | End: 2017-11-04

## 2017-11-04 RX ORDER — IPRATROPIUM/ALBUTEROL SULFATE 18-103MCG
3 AEROSOL WITH ADAPTER (GRAM) INHALATION EVERY 6 HOURS
Qty: 0 | Refills: 0 | Status: DISCONTINUED | OUTPATIENT
Start: 2017-11-04 | End: 2017-11-05

## 2017-11-04 RX ORDER — PANTOPRAZOLE SODIUM 20 MG/1
40 TABLET, DELAYED RELEASE ORAL
Qty: 0 | Refills: 0 | Status: DISCONTINUED | OUTPATIENT
Start: 2017-11-04 | End: 2017-11-05

## 2017-11-04 RX ADMIN — Medication 1 TABLET(S): at 21:55

## 2017-11-04 RX ADMIN — SODIUM CHLORIDE 180 MILLILITER(S): 9 INJECTION INTRAMUSCULAR; INTRAVENOUS; SUBCUTANEOUS at 15:27

## 2017-11-04 RX ADMIN — SODIUM CHLORIDE 180 MILLILITER(S): 9 INJECTION INTRAMUSCULAR; INTRAVENOUS; SUBCUTANEOUS at 10:58

## 2017-11-04 RX ADMIN — Medication 3 MILLILITER(S): at 19:35

## 2017-11-04 RX ADMIN — HYDROMORPHONE HYDROCHLORIDE 1 MILLIGRAM(S): 2 INJECTION INTRAMUSCULAR; INTRAVENOUS; SUBCUTANEOUS at 01:33

## 2017-11-04 RX ADMIN — HYDROMORPHONE HYDROCHLORIDE 1 MILLIGRAM(S): 2 INJECTION INTRAMUSCULAR; INTRAVENOUS; SUBCUTANEOUS at 07:30

## 2017-11-04 RX ADMIN — HEPARIN SODIUM 5000 UNIT(S): 5000 INJECTION INTRAVENOUS; SUBCUTANEOUS at 18:23

## 2017-11-04 RX ADMIN — Medication 1 TABLET(S): at 10:57

## 2017-11-04 RX ADMIN — Medication 3 MILLILITER(S): at 15:09

## 2017-11-04 RX ADMIN — Medication 1 TABLET(S): at 14:04

## 2017-11-04 RX ADMIN — Medication 50 MILLIGRAM(S): at 18:24

## 2017-11-04 RX ADMIN — AMLODIPINE BESYLATE 10 MILLIGRAM(S): 2.5 TABLET ORAL at 05:10

## 2017-11-04 RX ADMIN — Medication 600 MILLIGRAM(S): at 16:42

## 2017-11-04 RX ADMIN — Medication 0.5 MILLIGRAM(S): at 16:44

## 2017-11-04 RX ADMIN — Medication 100 MILLIGRAM(S): at 18:22

## 2017-11-04 RX ADMIN — Medication 40 MILLIEQUIVALENT(S): at 14:04

## 2017-11-04 RX ADMIN — Medication 50 MILLIGRAM(S): at 05:11

## 2017-11-04 RX ADMIN — MORPHINE SULFATE 2 MILLIGRAM(S): 50 CAPSULE, EXTENDED RELEASE ORAL at 16:49

## 2017-11-04 RX ADMIN — Medication 600 MILLIGRAM(S): at 18:22

## 2017-11-04 RX ADMIN — MORPHINE SULFATE 2 MILLIGRAM(S): 50 CAPSULE, EXTENDED RELEASE ORAL at 21:55

## 2017-11-04 RX ADMIN — Medication 1 SPRAY(S): at 05:10

## 2017-11-04 RX ADMIN — CEFTRIAXONE 100 GRAM(S): 500 INJECTION, POWDER, FOR SOLUTION INTRAMUSCULAR; INTRAVENOUS at 05:09

## 2017-11-04 RX ADMIN — SODIUM CHLORIDE 180 MILLILITER(S): 9 INJECTION INTRAMUSCULAR; INTRAVENOUS; SUBCUTANEOUS at 21:54

## 2017-11-04 RX ADMIN — HYDROMORPHONE HYDROCHLORIDE 1 MILLIGRAM(S): 2 INJECTION INTRAMUSCULAR; INTRAVENOUS; SUBCUTANEOUS at 09:19

## 2017-11-04 RX ADMIN — Medication 1 TABLET(S): at 16:42

## 2017-11-04 RX ADMIN — MORPHINE SULFATE 2 MILLIGRAM(S): 50 CAPSULE, EXTENDED RELEASE ORAL at 14:11

## 2017-11-04 RX ADMIN — Medication 25 MILLIGRAM(S): at 05:11

## 2017-11-04 RX ADMIN — SODIUM CHLORIDE 180 MILLILITER(S): 9 INJECTION INTRAMUSCULAR; INTRAVENOUS; SUBCUTANEOUS at 02:21

## 2017-11-04 RX ADMIN — HEPARIN SODIUM 5000 UNIT(S): 5000 INJECTION INTRAVENOUS; SUBCUTANEOUS at 05:10

## 2017-11-04 RX ADMIN — Medication 1 SPRAY(S): at 18:22

## 2017-11-04 RX ADMIN — Medication 600 MILLIGRAM(S): at 14:03

## 2017-11-04 RX ADMIN — Medication 100 MILLIGRAM(S): at 05:11

## 2017-11-04 RX ADMIN — Medication 40 MILLIEQUIVALENT(S): at 16:42

## 2017-11-04 RX ADMIN — SODIUM CHLORIDE 1000 MILLILITER(S): 9 INJECTION INTRAMUSCULAR; INTRAVENOUS; SUBCUTANEOUS at 01:33

## 2017-11-04 NOTE — PROGRESS NOTE ADULT - ASSESSMENT
*Abdominal pain and transaminitis 2ndry to Acute on Chronic  Pancreatitis  - IVF, pt received NS 2 L in the ER  - CT abd - Acute pancreatitis with a small cyst versus fluid collection near the pancreatic tail measuring 4.4 x 2.7 cm markedly decreased insize since prior exam. No abscess.  - AM lipase, LFTS, BMP  - lipid panel/triglycerides  - Clears   - pain control  - GI consult Dr. Asencio on call    *Alcoholism? vs intermittent ETOH use  - CIWA protocol with ativan PRN - not scoring  - Social work consult    *Leukocytosis 2ndry to reactive versus Recent Bronchitis/Laryngitis/Sinusitis  - completed course of Augmentin, has been on Doxy for few days, cont for now  - Add Rocephin  - FU CXR  - blood cx    IV. DVT Prophylaxis  - heparin *Abdominal pain and transaminitis 2ndry to Acute on Chronic  Pancreatitis  - IVF, pt received NS 2 L in the ER  - CT abd - Acute pancreatitis with a small cyst versus fluid collection near the pancreatic tail measuring 4.4 x 2.7 cm markedly decreased insize since prior exam. No abscess.  - lipase, LFTS trending down  - FU lipid panel/triglycerides  - FU MRCP  - Advance diet as tolerated   - pain control with Morphine   - GI consult appreciated     *Alcoholism? vs intermittent ETOH use  - CIWA protocol with ativan PRN - not scoring  - Social work consult    *Leukocytosis 2ndry to reactive versus Recent Bronchitis/Laryngitis/Sinusitis  - completed course of Augmentin, has been on Doxy for few days, cont for now  - Add Rocephin  - CXR - clear  - blood cx    * DVT Prophylaxis  - heparin

## 2017-11-04 NOTE — CONSULT NOTE ADULT - SUBJECTIVE AND OBJECTIVE BOX
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   He reports being unable to tolerate oral intake/decreased appetite since the night before.  He had a protein shake this morning at 8am, however at 2:30pm today he had a protein shake and felt severe abd pain.  He denies ETOH for the last 3 days,  he did drink 3-4 beers Sat and Sunday,  he reports he drinks this much only on the weekends.      Symptoms are similar to past. No nausea.     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,    HTN      GERD   Sinusitis   R knee surg (03 Nov 2017 21:03)      PAST MEDICAL & SURGICAL HISTORY:  HTN (hypertension)  Pancreatitis due to common bile duct stone: January 2017  Asthma: childhood - pt states does not have aymore  Pneumonia: 1 month ago  S/P right knee arthroscopy: 2 in 2003  1 in 2004      MEDICATIONS  (STANDING):  amLODIPine   Tablet 10 milliGRAM(s) Oral daily  cefTRIAXone   IVPB      doxycycline hyclate Capsule 100 milliGRAM(s) Oral every 12 hours  fluticasone propionate 50 MICROgram(s)/spray Nasal Spray 1 Spray(s) Both Nostrils two times a day  folic acid 1 milliGRAM(s) Oral daily  gemfibrozil 600 milliGRAM(s) Oral two times a day before meals  heparin  Injectable 5000 Unit(s) SubCutaneous every 12 hours  metoprolol     tartrate 50 milliGRAM(s) Oral two times a day  multivitamin 1 Tablet(s) Oral daily  pantoprazole    Tablet 40 milliGRAM(s) Oral before breakfast  sodium chloride 0.9%. 1000 milliLiter(s) (180 mL/Hr) IV Continuous <Continuous>  thiamine 100 milliGRAM(s) Oral daily    MEDICATIONS  (PRN):  HYDROmorphone  Injectable 1 milliGRAM(s) IV Push three times a day PRN Severe Pain (7 - 10)  LORazepam     Tablet 2 milliGRAM(s) Oral every 2 hours PRN CIWA-Ar score increase by 2 points and a total score of 7 or less  LORazepam     Tablet 2 milliGRAM(s) Oral every 1 hour PRN CIWA-Ar score 8 or greater  LORazepam   Injectable 1 milliGRAM(s) IV Push once PRN To control seizure activity  ondansetron Injectable 4 milliGRAM(s) IV Push every 6 hours PRN Nausea      Allergies    No Known Allergies    Intolerances        SOCIAL HISTORY:    FAMILY HISTORY:  No pertinent family history in first degree relatives      REVIEW OF SYSTEMS:    CONSTITUTIONAL: No weakness, fevers or chills  EYES/ENT: No visual changes;  No vertigo or throat pain   NECK: No pain or stiffness  RESPIRATORY: No cough, wheezing, hemoptysis; No shortness of breath  CARDIOVASCULAR: No chest pain or palpitations  GASTROINTESTINAL: No abdominal or epigastric pain. No nausea, vomiting, or hematemesis; No diarrhea or constipation. No melena or hematochezia.  GENITOURINARY: No dysuria, frequency or hematuria  NEUROLOGICAL: No numbness or weakness  SKIN: No itching, burning, rashes, or lesions   PSYCH: Normal mood and affect  All other review of systems is negative unless indicated above.    Vital Signs Last 24 Hrs  T(C): 36.4 (04 Nov 2017 07:56), Max: 37.3 (04 Nov 2017 00:05)  T(F): 97.6 (04 Nov 2017 07:56), Max: 99.1 (04 Nov 2017 00:05)  HR: 74 (04 Nov 2017 07:56) (74 - 109)  BP: 115/81 (04 Nov 2017 07:56) (115/81 - 163/94)  BP(mean): --  RR: 18 (04 Nov 2017 07:56) (18 - 19)  SpO2: 97% (04 Nov 2017 07:56) (97% - 100%)    PHYSICAL EXAM:    Constitutional: NAD, well-developed  HEENT: MMM  Neck: No LAD  Respiratory: CTAB  Cardiovascular: S1 and S2, RRR, no M/G/R  Gastrointestinal: BS+, soft, diffuse tenderness without rebound    Skin: No rashes    LABS:                        16.4   15.6  )-----------( 277      ( 03 Nov 2017 17:55 )             48.2     11-04    136  |  103  |  7   ----------------------------<  115<H>  3.4<L>   |  24  |  0.73    Ca    8.0<L>      04 Nov 2017 07:25  Phos  1.9     11-04  Mg     2.1     11-04    TPro  6.1  /  Alb  2.9<L>  /  TBili  1.7<H>  /  DBili  0.8<H>  /  AST  400<H>  /  ALT  607<H>  /  AlkPhos  107  11-04    PT/INR - ( 03 Nov 2017 17:55 )   PT: 12.2 sec;   INR: 1.13 ratio         PTT - ( 03 Nov 2017 17:55 )  PTT:30.3 sec  LIVER FUNCTIONS - ( 04 Nov 2017 07:25 )  Alb: 2.9 g/dL / Pro: 6.1 gm/dL / ALK PHOS: 107 U/L / ALT: 607 U/L / AST: 400 U/L / GGT: x             RADIOLOGY & ADDITIONAL STUDIES:

## 2017-11-04 NOTE — CONSULT NOTE ADULT - ASSESSMENT
38 yo male with history of pancreatitis, now with recurrent symptoms. Transaminases are markedly elevated. Would check MRCP to r/o biliary etiology. Aggressive IVF. Will follow - thanks!

## 2017-11-04 NOTE — PROGRESS NOTE ADULT - SUBJECTIVE AND OBJECTIVE BOX
HOSPITALIST PROGRESS NOTE:  SUBJECTIVE:  PCP: PMD Dr. Alvarado.  GI Dr. 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   He reports being unable to tolerate oral intake/decreased appetite since the night before.  He had a protein shake this morning at 8am, however at 2:30pm today he had a protein shake and felt severe abd pain.  He denies ETOH for the last 3 days,  he did drink 3-4 beers Sat and ,  he reports he drinks this much only on the weekends.          Allergies:  No Known Allergies    REVIEW OF SYSTEMS:  See HPI. All other review of systems is negative unless indicated above.     OBJECTIVE  Physical Exam:  Vital Signs:  Height (cm): 185.42 ( @ 00:05)  Weight (kg): 133.5 ( @ 00:05)  BMI (kg/m2): 38.8 ( @ 00:05)  BSA (m2): 2.54 ( @ 00:05)  Vital Signs Last 24 Hrs  T(C): 36.4 (2017 07:56), Max: 37.3 (2017 00:05)  T(F): 97.6 (2017 07:56), Max: 99.1 (2017 00:05)  HR: 74 (2017 07:56) (74 - 109)  BP: 115/81 (2017 07:56) (115/81 - 163/94)  BP(mean): --  RR: 18 (2017 07:56) (18 - 19)  SpO2: 97% (2017 07:56) (97% - 100%)  I&O's Summary    2017 07:01  -  2017 07:00  --------------------------------------------------------  IN: 1000 mL / OUT: 0 mL / NET: 1000 mL        Constitutional: NAD, awake and alert, well-developed  Neurological: AAO x 3, no focal deficits  HEENT: PERRLA, EOMI, MMM  Neck: Soft and supple, No LAD, No JVD  Respiratory: Breath sounds are clear bilaterally, No wheezing, rales or rhonchi  Cardiovascular: S1 and S2, regular rate and rhythm; no Murmurs, gallops or rubs  Gastrointestinal: Bowel Sounds present, soft, RLQ; R flank pain  	  nondistended, no guarding, no rebound tenderness  Back: No CVA tenderness   Extremities: No peripheral edema  Vascular: 2+ peripheral pulses  Musculoskeletal: 5/5 strength b/l upper and lower extremities  Skin: No rashes  Breast: Deferred  Rectal: Deferred    MEDICATIONS  (STANDING):  amLODIPine   Tablet 10 milliGRAM(s) Oral daily  cefTRIAXone   IVPB      doxycycline hyclate Capsule 100 milliGRAM(s) Oral every 12 hours  fluticasone propionate 50 MICROgram(s)/spray Nasal Spray 1 Spray(s) Both Nostrils two times a day  folic acid 1 milliGRAM(s) Oral daily  gemfibrozil 600 milliGRAM(s) Oral two times a day before meals  heparin  Injectable 5000 Unit(s) SubCutaneous every 12 hours  hydrochlorothiazide 25 milliGRAM(s) Oral daily  metoprolol     tartrate 50 milliGRAM(s) Oral two times a day  multivitamin 1 Tablet(s) Oral daily  pantoprazole    Tablet 40 milliGRAM(s) Oral before breakfast  sodium chloride 0.9%. 1000 milliLiter(s) (180 mL/Hr) IV Continuous <Continuous>  thiamine 100 milliGRAM(s) Oral daily      LABS: All Labs Reviewed:                        16.4   15.6  )-----------( 277      ( 2017 17:55 )             48.2     11-03    130<L>  |  96  |  14  ----------------------------<  125<H>  4.3   |  21<L>  |  1.03    Ca    8.9      2017 17:55    TPro  7.6  /  Alb  3.6  /  TBili  1.7<H>  /  DBili  x   /  AST  741<H>  /  ALT  918<H>  /  AlkPhos  142<H>  11-    PT/INR - ( 2017 17:55 )   PT: 12.2 sec;   INR: 1.13 ratio         PTT - ( 2017 17:55 )  PTT:30.3 sec        Urinalysis Basic - ( 2017 17:55 )    Color: Yellow / Appearance: Clear / S.010 / pH: x  Gluc: x / Ketone: Negative  / Bili: Negative / Urobili: 1 mg/dL   Blood: x / Protein: 15 mg/dL / Nitrite: Negative   Leuk Esterase: Negative / RBC: Negative /HPF / WBC 0-2   Sq Epi: x / Non Sq Epi: Occasional / Bacteria: Negative      RADIOLOGY/EKG:    < from: CT Abdomen and Pelvis w/ IV Cont (17 @ 20:18) >    IMPRESSION:  Enlargement of the pancreatic head with a mild amount of peripancreatic   inflammation, most compatible with acute pancreatitis. There is a small   cyst versus fluid collection near the pancreatic tail measuring 4.4 x 2.7   cm markedly decreased insize since prior exam. No abscess.    < end of copied text >    < from: US Abdomen Limited (17 @ 18:21) >      IMPRESSION: Hepatomegaly. Gallbladder appears unremarkable. Sonographic   Mcadams sign could not be assessed as patient was on pain medication.    < end of copied text > HOSPITALIST PROGRESS NOTE:  SUBJECTIVE:  PCP: PMD Dr. Alvarado.  GI Dr. 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   He reports being unable to tolerate oral intake/decreased appetite since the night before.  He had a protein shake this morning at 8am, however at 2:30pm today he had a protein shake and felt severe abd pain.  He denies ETOH for the last 3 days,  he did drink 3-4 beers Sat and ,  he reports he drinks this much only on the weekends.      : Pain in abdomen improved but still present. No N/V; +cough and occassional wheezing    Allergies:  No Known Allergies    REVIEW OF SYSTEMS:  See HPI. All other review of systems is negative unless indicated above.     OBJECTIVE  Physical Exam:  Vital Signs Last 24 Hrs  T(C): 36.4 (2017 07:56), Max: 37.3 (2017 00:05)  T(F): 97.6 (2017 07:56), Max: 99.1 (2017 00:05)  HR: 74 (2017 07:56) (74 - 109)  BP: 115/81 (2017 07:56) (115/81 - 163/94)  BP(mean): --  RR: 18 (2017 07:56) (18 - 19)  SpO2: 97% (2017 07:56) (97% - 100%)      Constitutional: NAD, awake and alert, well-developed  Neurological: AAO x 3, no focal deficits  HEENT: PERRLA, EOMI, MMM  Neck: Soft and supple, No LAD, No JVD  Respiratory: Breath sounds are clear bilaterally, No wheezing, rales or rhonchi  Cardiovascular: S1 and S2, regular rate and rhythm; no Murmurs, gallops or rubs  Gastrointestinal: Bowel Sounds present, soft, RLQ; R flank pain  	  nondistended, no guarding, no rebound tenderness  Back: No CVA tenderness   Extremities: No peripheral edema  Vascular: 2+ peripheral pulses  Musculoskeletal: 5/5 strength b/l upper and lower extremities  Skin: No rashes  Breast: Deferred  Rectal: Deferred    MEDICATIONS  (STANDING):  amLODIPine   Tablet 10 milliGRAM(s) Oral daily  cefTRIAXone   IVPB      doxycycline hyclate Capsule 100 milliGRAM(s) Oral every 12 hours  fluticasone propionate 50 MICROgram(s)/spray Nasal Spray 1 Spray(s) Both Nostrils two times a day  folic acid 1 milliGRAM(s) Oral daily  gemfibrozil 600 milliGRAM(s) Oral two times a day before meals  heparin  Injectable 5000 Unit(s) SubCutaneous every 12 hours  hydrochlorothiazide 25 milliGRAM(s) Oral daily  metoprolol     tartrate 50 milliGRAM(s) Oral two times a day  multivitamin 1 Tablet(s) Oral daily  pantoprazole    Tablet 40 milliGRAM(s) Oral before breakfast  sodium chloride 0.9%. 1000 milliLiter(s) (180 mL/Hr) IV Continuous <Continuous>  thiamine 100 milliGRAM(s) Oral daily      LABS: All Labs Reviewed:                        16.4   15.6  )-----------( 277      ( 2017 17:55 )             48.2     11-03    130<L>  |  96  |  14  ----------------------------<  125<H>  4.3   |  21<L>  |  1.03    Ca    8.9      2017 17:55    TPro  7.6  /  Alb  3.6  /  TBili  1.7<H>  /  DBili  x   /  AST  741<H>  /  ALT  918<H>  /  AlkPhos  142<H>  11-03    PT/INR - ( 2017 17:55 )   PT: 12.2 sec;   INR: 1.13 ratio         PTT - ( 2017 17:55 )  PTT:30.3 sec        Urinalysis Basic - ( 2017 17:55 )    Color: Yellow / Appearance: Clear / S.010 / pH: x  Gluc: x / Ketone: Negative  / Bili: Negative / Urobili: 1 mg/dL   Blood: x / Protein: 15 mg/dL / Nitrite: Negative   Leuk Esterase: Negative / RBC: Negative /HPF / WBC 0-2   Sq Epi: x / Non Sq Epi: Occasional / Bacteria: Negative      RADIOLOGY/EKG:    < from: CT Abdomen and Pelvis w/ IV Cont (17 @ 20:18) >    IMPRESSION:  Enlargement of the pancreatic head with a mild amount of peripancreatic   inflammation, most compatible with acute pancreatitis. There is a small   cyst versus fluid collection near the pancreatic tail measuring 4.4 x 2.7   cm markedly decreased insize since prior exam. No abscess.    < end of copied text >    < from: US Abdomen Limited (17 @ 18:21) >      IMPRESSION: Hepatomegaly. Gallbladder appears unremarkable. Sonographic   Mcadams sign could not be assessed as patient was on pain medication.    < end of copied text >    < from: Xray Chest 1 View AP/PA. (17 @ 09:12) >    Impression: Clear lungs.    < end of copied text >

## 2017-11-05 ENCOUNTER — TRANSCRIPTION ENCOUNTER (OUTPATIENT)
Age: 37
End: 2017-11-05

## 2017-11-05 VITALS
RESPIRATION RATE: 18 BRPM | DIASTOLIC BLOOD PRESSURE: 75 MMHG | SYSTOLIC BLOOD PRESSURE: 140 MMHG | TEMPERATURE: 98 F | OXYGEN SATURATION: 98 % | HEART RATE: 661 BPM

## 2017-11-05 LAB
ALBUMIN SERPL ELPH-MCNC: 2.8 G/DL — LOW (ref 3.3–5)
ALP SERPL-CCNC: 109 U/L — SIGNIFICANT CHANGE UP (ref 40–120)
ALT FLD-CCNC: 429 U/L — HIGH (ref 12–78)
ANION GAP SERPL CALC-SCNC: 8 MMOL/L — SIGNIFICANT CHANGE UP (ref 5–17)
AST SERPL-CCNC: 220 U/L — HIGH (ref 15–37)
BILIRUB SERPL-MCNC: 1.4 MG/DL — HIGH (ref 0.2–1.2)
BUN SERPL-MCNC: 2 MG/DL — LOW (ref 7–23)
CALCIUM SERPL-MCNC: 8.6 MG/DL — SIGNIFICANT CHANGE UP (ref 8.5–10.1)
CHLORIDE SERPL-SCNC: 105 MMOL/L — SIGNIFICANT CHANGE UP (ref 96–108)
CO2 SERPL-SCNC: 28 MMOL/L — SIGNIFICANT CHANGE UP (ref 22–31)
CREAT SERPL-MCNC: 0.63 MG/DL — SIGNIFICANT CHANGE UP (ref 0.5–1.3)
GLUCOSE SERPL-MCNC: 97 MG/DL — SIGNIFICANT CHANGE UP (ref 70–99)
HCT VFR BLD CALC: 40.9 % — SIGNIFICANT CHANGE UP (ref 39–50)
HGB BLD-MCNC: 13.6 G/DL — SIGNIFICANT CHANGE UP (ref 13–17)
LIDOCAIN IGE QN: 476 U/L — HIGH (ref 73–393)
MCHC RBC-ENTMCNC: 33.1 GM/DL — SIGNIFICANT CHANGE UP (ref 32–36)
MCHC RBC-ENTMCNC: 33.5 PG — SIGNIFICANT CHANGE UP (ref 27–34)
MCV RBC AUTO: 101 FL — HIGH (ref 80–100)
PLATELET # BLD AUTO: 137 K/UL — LOW (ref 150–400)
POTASSIUM SERPL-MCNC: 4.2 MMOL/L — SIGNIFICANT CHANGE UP (ref 3.5–5.3)
POTASSIUM SERPL-SCNC: 4.2 MMOL/L — SIGNIFICANT CHANGE UP (ref 3.5–5.3)
PROT SERPL-MCNC: 6.3 GM/DL — SIGNIFICANT CHANGE UP (ref 6–8.3)
RBC # BLD: 4.05 M/UL — LOW (ref 4.2–5.8)
RBC # FLD: 14.3 % — SIGNIFICANT CHANGE UP (ref 10.3–14.5)
SODIUM SERPL-SCNC: 141 MMOL/L — SIGNIFICANT CHANGE UP (ref 135–145)
WBC # BLD: 5.3 K/UL — SIGNIFICANT CHANGE UP (ref 3.8–10.5)
WBC # FLD AUTO: 5.3 K/UL — SIGNIFICANT CHANGE UP (ref 3.8–10.5)

## 2017-11-05 RX ADMIN — Medication 600 MILLIGRAM(S): at 05:20

## 2017-11-05 RX ADMIN — Medication 100 MILLIGRAM(S): at 05:20

## 2017-11-05 RX ADMIN — Medication 0.5 MILLIGRAM(S): at 00:02

## 2017-11-05 RX ADMIN — Medication 3 MILLILITER(S): at 10:59

## 2017-11-05 RX ADMIN — HEPARIN SODIUM 5000 UNIT(S): 5000 INJECTION INTRAVENOUS; SUBCUTANEOUS at 05:20

## 2017-11-05 RX ADMIN — CEFTRIAXONE 100 GRAM(S): 500 INJECTION, POWDER, FOR SOLUTION INTRAMUSCULAR; INTRAVENOUS at 05:19

## 2017-11-05 RX ADMIN — Medication 1 SPRAY(S): at 05:20

## 2017-11-05 RX ADMIN — Medication 1 TABLET(S): at 08:33

## 2017-11-05 RX ADMIN — Medication 50 MILLIGRAM(S): at 05:20

## 2017-11-05 RX ADMIN — SODIUM CHLORIDE 180 MILLILITER(S): 9 INJECTION INTRAMUSCULAR; INTRAVENOUS; SUBCUTANEOUS at 08:41

## 2017-11-05 RX ADMIN — AMLODIPINE BESYLATE 10 MILLIGRAM(S): 2.5 TABLET ORAL at 05:20

## 2017-11-05 RX ADMIN — PANTOPRAZOLE SODIUM 40 MILLIGRAM(S): 20 TABLET, DELAYED RELEASE ORAL at 05:20

## 2017-11-05 RX ADMIN — Medication 1 TABLET(S): at 11:54

## 2017-11-05 RX ADMIN — SODIUM CHLORIDE 180 MILLILITER(S): 9 INJECTION INTRAMUSCULAR; INTRAVENOUS; SUBCUTANEOUS at 03:23

## 2017-11-05 RX ADMIN — Medication 600 MILLIGRAM(S): at 05:25

## 2017-11-05 NOTE — DISCHARGE NOTE ADULT - OTHER SIGNIFICANT FINDINGS
Lab Results:  CBC  CBC Full  -  ( 2017 07:12 )  WBC Count : 5.3 K/uL  Hemoglobin : 13.6 g/dL  Hematocrit : 40.9 %  Platelet Count - Automated : 137 K/uL  Mean Cell Volume : 101.0 fl  Mean Cell Hemoglobin : 33.5 pg  Mean Cell Hemoglobin Concentration : 33.1 gm/dL  Auto Neutrophil # : x  Auto Lymphocyte # : x  Auto Monocyte # : x  Auto Eosinophil # : x  Auto Basophil # : x  Auto Neutrophil % : x  Auto Lymphocyte % : x  Auto Monocyte % : x  Auto Eosinophil % : x  Auto Basophil % : x    .		Differential:	[] Automated		[] Manual  Chemistry                        13.6   5.3   )-----------( 137      ( 2017 07:12 )             40.9     11-05    141  |  105  |  2<L>  ----------------------------<  97  4.2   |  28  |  0.63    Ca    8.6      2017 07:12  Phos  1.9     11-  Mg     2.1     -    TPro  6.3  /  Alb  2.8<L>  /  TBili  1.4<H>  /  DBili  x   /  AST  220<H>  /  ALT  429<H>  /  AlkPhos  109  11-05    LIVER FUNCTIONS - ( 2017 07:12 )  Alb: 2.8 g/dL / Pro: 6.3 gm/dL / ALK PHOS: 109 U/L / ALT: 429 U/L / AST: 220 U/L / GGT: x           PT/INR - ( 2017 17:55 )   PT: 12.2 sec;   INR: 1.13 ratio         PTT - ( 2017 17:55 )  PTT:30.3 sec  Urinalysis Basic - ( 2017 17:55 )    Color: Yellow / Appearance: Clear / S.010 / pH: x  Gluc: x / Ketone: Negative  / Bili: Negative / Urobili: 1 mg/dL   Blood: x / Protein: 15 mg/dL / Nitrite: Negative   Leuk Esterase: Negative / RBC: Negative /HPF / WBC 0-2   Sq Epi: x / Non Sq Epi: Occasional / Bacteria: Negative    RADIOLOGY/EKG:    < from: CT Abdomen and Pelvis w/ IV Cont (17 @ 20:18) >    IMPRESSION:  Enlargement of the pancreatic head with a mild amount of peripancreatic   inflammation, most compatible with acute pancreatitis. There is a small   cyst versus fluid collection near the pancreatic tail measuring 4.4 x 2.7   cm markedly decreased insize since prior exam. No abscess.    < end of copied text >    < from: US Abdomen Limited (17 @ 18:21) >      IMPRESSION: Hepatomegaly. Gallbladder appears unremarkable. Sonographic   Mcadams sign could not be assessed as patient was on pain medication.    < end of copied text >    < from: Xray Chest 1 View AP/PA. (17 @ 09:12) >    Impression: Clear lungs.    < end of copied text >    < from: MRI Abdomen w/Cont (17 @ 13:26) >    IMPRESSION:    Acute interstitial pancreatitis of the pancreatic head.    Decreasing size of extra pancreatic walled off necrosis inferior to the   tail.    No gallstones or biliary dilation.    < end of copied text >

## 2017-11-05 NOTE — DISCHARGE NOTE ADULT - HOSPITAL COURSE
HOSPITALIST PROGRESS NOTE:  SUBJECTIVE:  PCP: PMD Dr. Alvarado.  GI Dr. Rodriguez previously    Chief Complaint: Patient is a 37y old  Male who presents with a chief complaint of abd discomfort (03 Nov 2017 21:03)    HPI:  Pt is a 36 y/o gentleman  w/ pmhx of Gallstone/triglyceride pancreatitis Dec 2016, HTN who presents c/o  R sided  "gassy pain" 8/10 dull, aching starting tonight,  with abd pain radiating to his back.   He reports being unable to tolerate oral intake/decreased appetite since the night before.  He had a protein shake this morning at 8am, however at 2:30pm today he had a protein shake and felt severe abd pain.  He denies ETOH for the last 3 days,  he did drink 3-4 beers Sat and Sunday,  he reports he drinks this much only on the weekends.      11/5: Pain in abdomen improved but still present. No N/V; +cough and occassional wheezing  11/6: Patient tolerated diet. Has no pain.     *Abdominal pain and transaminitis 2ndry to Acute on Chronic  Pancreatitis  - IVF, pt received NS 2 L in the ER  - CT abd - Acute pancreatitis with a small cyst versus fluid collection near the pancreatic tail measuring 4.4 x 2.7 cm markedly decreased insize since prior exam. No abscess.  - lipase, LFTS trending down  - elevated triglycerides - patient would like to control with Diet and FU with PCP  - MRCP - Acute interstitial pancreatitis of the pancreatic head. Decreasing size of extra pancreatic walled off necrosis inferior to the   tail. No gallstones or biliary dilation.  - Advance diet as tolerated   - pain control with Morphine   - GI consult appreciated     *Alcoholism? vs intermittent ETOH use  - CIWA protocol with ativan PRN - not scoring    *Leukocytosis 2ndry to reactive versus Recent Bronchitis/Laryngitis/Sinusitis - resolved  - completed course of Augmentin, has been on Doxy for few days, cont for now  - Add Rocephin  - CXR - clear  - blood cx - NGTD    * DVT Prophylaxis  - heparin    *Discharge Instructions/Follow Up/Pending Labs:  -discharge patient home. FU with Patient PCP regarding high triglycerides and Fu with patient PCP; also FU with PCP regarding bronchitis and finish remainder of doxycycline    Message left for Dr Alvarado  total time: 45 minutes

## 2017-11-05 NOTE — DISCHARGE NOTE ADULT - ADDITIONAL INSTRUCTIONS
*Discharge Instructions/Follow Up/Pending Labs:  -discharge patient home. FU with PCP regarding high triglycerides; also FU with PCP regarding bronchitis and finish remainder of doxycycline

## 2017-11-05 NOTE — PROGRESS NOTE ADULT - SUBJECTIVE AND OBJECTIVE BOX
Patient is a 37y old  Male who presents with a chief complaint of abd discomfort (03 Nov 2017 21:03)      HPI:  pt feeling better with much decreased pain    PAST MEDICAL & SURGICAL HISTORY:  HTN (hypertension)  Pancreatitis due to common bile duct stone: January 2017  Asthma: childhood - pt states does not have aymore  Pneumonia: 1 month ago  S/P right knee arthroscopy: 2 in 2003  1 in 2004    MEDICATIONS  (STANDING):  ALBUTerol/ipratropium for Nebulization 3 milliLiter(s) Nebulizer every 6 hours  amLODIPine   Tablet 10 milliGRAM(s) Oral daily  cefTRIAXone   IVPB      doxycycline hyclate Capsule 100 milliGRAM(s) Oral every 12 hours  fluticasone propionate 50 MICROgram(s)/spray Nasal Spray 1 Spray(s) Both Nostrils two times a day  folic acid 1 milliGRAM(s) Oral daily  gemfibrozil 600 milliGRAM(s) Oral two times a day before meals  guaiFENesin  milliGRAM(s) Oral every 12 hours  heparin  Injectable 5000 Unit(s) SubCutaneous every 12 hours  metoprolol     tartrate 50 milliGRAM(s) Oral two times a day  multivitamin 1 Tablet(s) Oral daily  pantoprazole    Tablet 40 milliGRAM(s) Oral before breakfast  sodium chloride 0.9%. 1000 milliLiter(s) (180 mL/Hr) IV Continuous <Continuous>  thiamine 100 milliGRAM(s) Oral daily    MEDICATIONS  (PRN):  ALPRAZolam 0.5 milliGRAM(s) Oral daily PRN anxiety  morphine  - Injectable 2 milliGRAM(s) SubCutaneous every 6 hours PRN Moderate Pain (4 - 6)  ondansetron Injectable 4 milliGRAM(s) IV Push every 6 hours PRN Nausea    Allergies  No Known Allergies  REVIEW OF SYSTEMS:    CONSTITUTIONAL: No weakness, fevers or chills  RESPIRATORY: No cough, wheezing, hemoptysis; No shortness of breath  CARDIOVASCULAR: No chest pain or palpitations  GASTROINTESTINAL: improving pain, nausea  All other review of systems is negative unless indicated above.    Vital Signs Last 24 Hrs  T(C): 36.7 (05 Nov 2017 04:59), Max: 36.8 (04 Nov 2017 18:11)  T(F): 98 (05 Nov 2017 04:59), Max: 98.2 (04 Nov 2017 18:11)  HR: 80 (05 Nov 2017 11:00) (78 - 616)  BP: 127/60 (05 Nov 2017 04:59) (127/60 - 133/77)  BP(mean): --  RR: 17 (05 Nov 2017 04:59) (17 - 17)  SpO2: 92% (05 Nov 2017 04:59) (92% - 98%)    PHYSICAL EXAM:    Constitutional: NAD, well-developed    Gastrointestinal: BS+, soft, NT/ND    LABS:                        13.6   5.3   )-----------( 137      ( 05 Nov 2017 07:12 )             40.9     11-05    141  |  105  |  2<L>  ----------------------------<  97  4.2   |  28  |  0.63    Ca    8.6      05 Nov 2017 07:12  Phos  1.9     11-04  Mg     2.1     11-04    TPro  6.3  /  Alb  2.8<L>  /  TBili  1.4<H>  /  DBili  x   /  AST  220<H>  /  ALT  429<H>  /  AlkPhos  109  11-05    PT/INR - ( 03 Nov 2017 17:55 )   PT: 12.2 sec;   INR: 1.13 ratio         PTT - ( 03 Nov 2017 17:55 )  PTT:30.3 sec  LIVER FUNCTIONS - ( 05 Nov 2017 07:12 )  Alb: 2.8 g/dL / Pro: 6.3 gm/dL / ALK PHOS: 109 U/L / ALT: 429 U/L / AST: 220 U/L / GGT: x             RADIOLOGY & ADDITIONAL STUDIES:

## 2017-11-05 NOTE — DISCHARGE NOTE ADULT - MEDICATION SUMMARY - MEDICATIONS TO TAKE
I will START or STAY ON the medications listed below when I get home from the hospital:    gemfibrozil 600 mg oral tablet  -- 1 tab(s) by mouth 2 times a day before meals  -- Indication: For High triglyceride    doxycycline hyclate 100 mg oral capsule  -- 1 cap(s) by mouth 2 times a day  -- Indication: For bronchitis    Metoprolol Tartrate 50 mg oral tablet  -- 1 tab(s) by mouth 2 times a day  -- Indication: For Hypertension    amLODIPine 10 mg oral tablet  -- 1 tab(s) by mouth once a day  -- Indication: For Hypertension    hydroCHLOROthiazide 25 mg oral tablet  -- 1 tab(s) by mouth once a day  -- Indication: For Hypertension    Valerian Root oral capsule  -- 450 milligram(s) by mouth 2 times a day (AM and afternoon) for anxiety  -- Indication: For Home meds; would avoid herbal meds    Valerian oral capsule  -- 900 milligram(s) by mouth once a day (at bedtime) for sleep  -- Indication: For Home meds; would avoid herbal meds    fluticasone 50 mcg/inh nasal spray  -- 1 spray(s) in each nostril 2 times a day  -- Indication: For nasal spray    pantoprazole 40 mg oral delayed release tablet  -- 1 tab(s) by mouth once a day  -- Indication: For gerd    Multiple Vitamins oral tablet  -- 1 tab(s) by mouth once a day  -- Indication: For vitamins

## 2017-11-05 NOTE — DISCHARGE NOTE ADULT - CARE PROVIDER_API CALL
John Alvarado), Internal Medicine  [address] [place] [address]  Phone: [phone]  Fax: [phone]    FU with GI,   Phone: (   )    -  Fax: (   )    -

## 2017-11-05 NOTE — DISCHARGE NOTE ADULT - PATIENT PORTAL LINK FT
“You can access the Follow[employer] Patient Portal, offered by [facility], by registering with the following website: [email or web address]”

## 2017-11-05 NOTE — PROGRESS NOTE ADULT - ASSESSMENT
*Abdominal pain and transaminitis 2ndry to Acute on Chronic  Pancreatitis  - IVF, pt received NS 2 L in the ER  - CT abd - Acute pancreatitis with a small cyst versus fluid collection near the pancreatic tail measuring 4.4 x 2.7 cm markedly decreased insize since prior exam. No abscess.  - lipase, LFTS trending down  - elevated triglycerides - patient would like to control with Diet and FU with PCP  - MRCP - Acute interstitial pancreatitis of the pancreatic head. Decreasing size of extra pancreatic walled off necrosis inferior to the   tail. No gallstones or biliary dilation.  - Advance diet as tolerated   - pain control with Morphine   - GI consult appreciated     *Alcoholism? vs intermittent ETOH use  - CIWA protocol with ativan PRN - not scoring    *Leukocytosis 2ndry to reactive versus Recent Bronchitis/Laryngitis/Sinusitis - resolved  - completed course of Augmentin, has been on Doxy for few days, cont for now  - Add Rocephin  - CXR - clear  - blood cx - NGTD    * DVT Prophylaxis  - heparin    *Discharge Instructions/Follow Up/Pending Labs:  -discharge patient home. FU with Patient PCP regarding high triglycerides and Fu with patient PCP; also FU with PCP regarding bronchitis and finish remainder of doxycycline    Message left for Dr Alvarado  total time: 45 minutes

## 2017-11-05 NOTE — DISCHARGE NOTE ADULT - PLAN OF CARE
Abdominal pain and transaminitis 2ndry to Acute on Chronic  Pancreatitis - elevated triglycerides 486- control with Diet, continue gemfibrozil and FU with PCP for adjustment of medications  - MRCP - Acute interstitial pancreatitis of the pancreatic head. Decreasing size of extra pancreatic walled off necrosis inferior to the tail. No gallstones or biliary dilation. Bronchitis/Laryngitis/Sinusitis Fu with patient PCP; also FU with PCP regarding bronchitis and finish remainder of doxycycline

## 2017-11-05 NOTE — DISCHARGE NOTE ADULT - CARE PLAN
Principal Discharge DX:	Acute pancreatitis with infected necrosis, unspecified pancreatitis type  Goal:	Abdominal pain and transaminitis 2ndry to Acute on Chronic  Pancreatitis  Instructions for follow-up, activity and diet:	- elevated triglycerides 486- control with Diet, continue gemfibrozil and FU with PCP for adjustment of medications  - MRCP - Acute interstitial pancreatitis of the pancreatic head. Decreasing size of extra pancreatic walled off necrosis inferior to the tail. No gallstones or biliary dilation.  Goal:	Bronchitis/Laryngitis/Sinusitis  Instructions for follow-up, activity and diet:	Fu with patient PCP; also FU with PCP regarding bronchitis and finish remainder of doxycycline

## 2017-11-05 NOTE — PROGRESS NOTE ADULT - SUBJECTIVE AND OBJECTIVE BOX
HOSPITALIST PROGRESS NOTE:  SUBJECTIVE:  PCP: PMD Dr. Alvarado.  GI Dr. 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   He reports being unable to tolerate oral intake/decreased appetite since the night before.  He had a protein shake this morning at 8am, however at 2:30pm today he had a protein shake and felt severe abd pain.  He denies ETOH for the last 3 days,  he did drink 3-4 beers Sat and ,  he reports he drinks this much only on the weekends.      : Pain in abdomen improved but still present. No N/V; +cough and occassional wheezing  : Patient tolerated diet. Has no pain.     Allergies:  No Known Allergies    REVIEW OF SYSTEMS:  See HPI. All other review of systems is negative unless indicated above.     OBJECTIVE  Physical Exam:  Vital Signs Last 24 Hrs  T(C): 36.6 (2017 11:58), Max: 36.8 (2017 18:11)  T(F): 97.9 (2017 11:58), Max: 98.2 (2017 18:11)  HR: 79 (2017 11:58) (78 - 616)  BP: 140/75 (2017 11:58) (127/60 - 140/75)  BP(mean): --  RR: 18 (2017 11:58) (17 - 18)  SpO2: 98% (2017 11:58) (92% - 98%)    Constitutional: NAD, awake and alert, well-developed  Neurological: AAO x 3, no focal deficits  HEENT: PERRLA, EOMI, MMM  Neck: Soft and supple, No LAD, No JVD  Respiratory: Breath sounds are clear bilaterally, No wheezing, rales or rhonchi  Cardiovascular: S1 and S2, regular rate and rhythm; no Murmurs, gallops or rubs  Gastrointestinal: Bowel Sounds present, soft, RLQ; No tenderness  	  nondistended, no guarding, no rebound tenderness  Back: No CVA tenderness   Extremities: No peripheral edema  Vascular: 2+ peripheral pulses  Musculoskeletal: 5/5 strength b/l upper and lower extremities  Skin: No rashes  Breast: Deferred  Rectal: Deferred    MEDICATIONS  (STANDING):  amLODIPine   Tablet 10 milliGRAM(s) Oral daily  cefTRIAXone   IVPB      doxycycline hyclate Capsule 100 milliGRAM(s) Oral every 12 hours  fluticasone propionate 50 MICROgram(s)/spray Nasal Spray 1 Spray(s) Both Nostrils two times a day  folic acid 1 milliGRAM(s) Oral daily  gemfibrozil 600 milliGRAM(s) Oral two times a day before meals  heparin  Injectable 5000 Unit(s) SubCutaneous every 12 hours  hydrochlorothiazide 25 milliGRAM(s) Oral daily  metoprolol     tartrate 50 milliGRAM(s) Oral two times a day  multivitamin 1 Tablet(s) Oral daily  pantoprazole    Tablet 40 milliGRAM(s) Oral before breakfast  sodium chloride 0.9%. 1000 milliLiter(s) (180 mL/Hr) IV Continuous <Continuous>  thiamine 100 milliGRAM(s) Oral daily    Lab Results:  CBC  CBC Full  -  ( 2017 07:12 )  WBC Count : 5.3 K/uL  Hemoglobin : 13.6 g/dL  Hematocrit : 40.9 %  Platelet Count - Automated : 137 K/uL  Mean Cell Volume : 101.0 fl  Mean Cell Hemoglobin : 33.5 pg  Mean Cell Hemoglobin Concentration : 33.1 gm/dL  Auto Neutrophil # : x  Auto Lymphocyte # : x  Auto Monocyte # : x  Auto Eosinophil # : x  Auto Basophil # : x  Auto Neutrophil % : x  Auto Lymphocyte % : x  Auto Monocyte % : x  Auto Eosinophil % : x  Auto Basophil % : x    .		Differential:	[] Automated		[] Manual  Chemistry                        13.6   5.3   )-----------( 137      ( 2017 07:12 )             40.9     11-05    141  |  105  |  2<L>  ----------------------------<  97  4.2   |  28  |  0.63    Ca    8.6      2017 07:12  Phos  1.9     11-04  Mg     2.1     11-04    TPro  6.3  /  Alb  2.8<L>  /  TBili  1.4<H>  /  DBili  x   /  AST  220<H>  /  ALT  429<H>  /  AlkPhos  109  11-05    LIVER FUNCTIONS - ( 2017 07:12 )  Alb: 2.8 g/dL / Pro: 6.3 gm/dL / ALK PHOS: 109 U/L / ALT: 429 U/L / AST: 220 U/L / GGT: x           PT/INR - ( 2017 17:55 )   PT: 12.2 sec;   INR: 1.13 ratio         PTT - ( 2017 17:55 )  PTT:30.3 sec  Urinalysis Basic - ( 2017 17:55 )    Color: Yellow / Appearance: Clear / S.010 / pH: x  Gluc: x / Ketone: Negative  / Bili: Negative / Urobili: 1 mg/dL   Blood: x / Protein: 15 mg/dL / Nitrite: Negative   Leuk Esterase: Negative / RBC: Negative /HPF / WBC 0-2   Sq Epi: x / Non Sq Epi: Occasional / Bacteria: Negative      MICROBIOLOGY/CULTURES:  Culture Results:   No growth to date. ( @ 07:25)  Culture Results:   No growth to date. ( @ 07:19)    Urinalysis Basic - ( 2017 17:55 )    Color: Yellow / Appearance: Clear / S.010 / pH: x  Gluc: x / Ketone: Negative  / Bili: Negative / Urobili: 1 mg/dL   Blood: x / Protein: 15 mg/dL / Nitrite: Negative   Leuk Esterase: Negative / RBC: Negative /HPF / WBC 0-2   Sq Epi: x / Non Sq Epi: Occasional / Bacteria: Negative      RADIOLOGY/EKG:    < from: CT Abdomen and Pelvis w/ IV Cont (17 @ 20:18) >    IMPRESSION:  Enlargement of the pancreatic head with a mild amount of peripancreatic   inflammation, most compatible with acute pancreatitis. There is a small   cyst versus fluid collection near the pancreatic tail measuring 4.4 x 2.7   cm markedly decreased insize since prior exam. No abscess.    < end of copied text >    < from: US Abdomen Limited (17 @ 18:21) >      IMPRESSION: Hepatomegaly. Gallbladder appears unremarkable. Sonographic   Mcadams sign could not be assessed as patient was on pain medication.    < end of copied text >    < from: Xray Chest 1 View AP/PA. (17 @ 09:12) >    Impression: Clear lungs.    < end of copied text >    < from: MRI Abdomen w/Cont (17 @ 13:26) >    IMPRESSION:    Acute interstitial pancreatitis of the pancreatic head.    Decreasing size of extra pancreatic walled off necrosis inferior to the   tail.    No gallstones or biliary dilation.    < end of copied text >

## 2017-11-08 DIAGNOSIS — K76.0 FATTY (CHANGE OF) LIVER, NOT ELSEWHERE CLASSIFIED: ICD-10-CM

## 2017-11-08 DIAGNOSIS — K86.2 CYST OF PANCREAS: ICD-10-CM

## 2017-11-08 DIAGNOSIS — J04.0 ACUTE LARYNGITIS: ICD-10-CM

## 2017-11-08 DIAGNOSIS — Z72.89 OTHER PROBLEMS RELATED TO LIFESTYLE: ICD-10-CM

## 2017-11-08 DIAGNOSIS — K86.1 OTHER CHRONIC PANCREATITIS: ICD-10-CM

## 2017-11-08 DIAGNOSIS — J32.9 CHRONIC SINUSITIS, UNSPECIFIED: ICD-10-CM

## 2017-11-08 DIAGNOSIS — I10 ESSENTIAL (PRIMARY) HYPERTENSION: ICD-10-CM

## 2017-11-08 DIAGNOSIS — J40 BRONCHITIS, NOT SPECIFIED AS ACUTE OR CHRONIC: ICD-10-CM

## 2017-11-08 DIAGNOSIS — R74.0 NONSPECIFIC ELEVATION OF LEVELS OF TRANSAMINASE AND LACTIC ACID DEHYDROGENASE [LDH]: ICD-10-CM

## 2017-11-08 DIAGNOSIS — D72.829 ELEVATED WHITE BLOOD CELL COUNT, UNSPECIFIED: ICD-10-CM

## 2017-11-08 DIAGNOSIS — R10.9 UNSPECIFIED ABDOMINAL PAIN: ICD-10-CM

## 2017-11-08 DIAGNOSIS — K85.92 ACUTE PANCREATITIS WITH INFECTED NECROSIS, UNSPECIFIED: ICD-10-CM

## 2017-11-08 DIAGNOSIS — K21.9 GASTRO-ESOPHAGEAL REFLUX DISEASE WITHOUT ESOPHAGITIS: ICD-10-CM

## 2017-11-09 LAB
CULTURE RESULTS: SIGNIFICANT CHANGE UP
CULTURE RESULTS: SIGNIFICANT CHANGE UP
SPECIMEN SOURCE: SIGNIFICANT CHANGE UP
SPECIMEN SOURCE: SIGNIFICANT CHANGE UP

## 2018-02-11 NOTE — ED ADULT NURSE NOTE - HOW OFTEN DO YOU HAVE A DRINK CONTAINING ALCOHOL?
ICU RN NOTE



WHILE SUCTIONING, PT DESATURATED TO LOW 80'S. NOTED WITH PRODUCTIVE COUGH. NO DISTRESS 
NOTED. SPOKE WITH RT TO BE PLACED ON BIPAP 15/5 FIO2 50% SATURATING 93%. WILL CONTINUE TO 
MONITOR. Two to four times a month

## 2019-04-11 NOTE — H&P ADULT - NSHPPOAURINARYCATHETER_GEN_ALL_CORE
Problem: Fall Risk (Adult)  Goal: Absence of Fall  Outcome: Ongoing (interventions implemented as appropriate)      Problem: Cardiac: Heart Failure (Adult)  Goal: Signs and Symptoms of Listed Potential Problems Will be Absent, Minimized or Managed (Cardiac: Heart Failure)  Outcome: Ongoing (interventions implemented as appropriate)      Problem: Arrhythmia/Dysrhythmia (Symptomatic) (Adult)  Goal: Signs and Symptoms of Listed Potential Problems Will be Absent, Minimized or Managed (Arrhythmia/Dysrhythmia)  Outcome: Ongoing (interventions implemented as appropriate)      Problem: Skin Injury Risk (Adult)  Goal: Skin Health and Integrity  Outcome: Ongoing (interventions implemented as appropriate)      Problem: Patient Care Overview  Goal: Plan of Care Review  Outcome: Ongoing (interventions implemented as appropriate)   04/11/19 1529   Coping/Psychosocial   Plan of Care Reviewed With patient   Plan of Care Review   Progress no change   OTHER   Outcome Summary VSS. No c/o pain today. Patient has not ate much today. Turned every 2 hours. Alves care every 4 hours. IV Bumex continued. Moderate urine output. No BM today. Cont to monitor          no

## 2022-03-10 NOTE — ASU PATIENT PROFILE, ADULT - CENTRAL VENOUS CATHETER
Sydney Clink  3/10/22     Chief Complaint   Patient presents with    Diabetes     physical        Allergies   Allergen Reactions    Atenolol      Cough    Sulfa Antibiotics Rash        Current Outpatient Medications   Medication Sig Dispense Refill    hydroCHLOROthiazide (HYDRODIURIL) 25 MG tablet Take 1 tablet by mouth every morning 30 tablet 5    levothyroxine (SYNTHROID) 25 MCG tablet Take 1 tablet by mouth Daily 90 tablet 3    JANUMET -1000 MG TB24 TAKE 1 TABLET BY MOUTH EVERY DAY 90 tablet 3    losartan (COZAAR) 50 MG tablet Take 1 tablet by mouth daily 90 tablet 3    simvastatin (ZOCOR) 20 MG tablet Take 1 tablet by mouth nightly 90 tablet 3    allopurinol (ZYLOPRIM) 100 MG tablet Take 2 tablets by mouth every day 180 tablet 3    glimepiride (AMARYL) 1 MG tablet Take 1 tablet by mouth every morning (before breakfast) 90 tablet 3    FreeStyle Lancets MISC 1 each by Other route daily 100 each 3    metoprolol succinate (TOPROL XL) 50 MG extended release tablet Take 1 tablet by mouth daily 1 daily 90 tablet 3    Lancets MISC 1 each by Does not apply route daily 100 each 3    blood glucose test strips (ASCENSIA AUTODISC VI;ONE TOUCH ULTRA TEST VI) strip 1 each by Other route daily daily 100 each 3    chlorthalidone (HYGROTON) 25 MG tablet Take 25 mg by mouth daily       aspirin 81 MG tablet Take 81 mg by mouth daily 4 day hold per Dr. Ashley Shaikh      Calcium Carbonate-Vitamin D (CALCIUM + D) 600-200 MG-UNIT TABS Take 1 tablet by mouth daily Ld 2/1/2019      Multiple Vitamins-Minerals (PRESERVISION AREDS PO) Take 1 tablet by mouth 2 times daily Instructed to hold 5 days pre-op      Travoprost (TRAVATAN Z OP) Place 1 drop into both eyes nightly. No current facility-administered medications for this visit. HPI: Patient comes in for her annual physical.  She is now 79years of age. Currently she feels fairly well. She denies any chest pain or shortness of breath.   She remains on all her usual meds and supplements the same as listed on her med list, which was reviewed with her. She did not get her prephysical labs done. Review of Systems    General:   no weight change, no malaise, no fatigue, no change in appetite, no sleep disturbance, no fever/chills, no night sweats. Skin:                no abnormal pigmentation, no rash, no scaling, no itching, no masses, no hair or nail changes  Eyes:               no blurring, no diplopia, no eye pain, no glaucoma, no cataracts  ENT:                 no hearing loss, no tinnitus, no vertigo, no nosebleed, no nasal congestion, no rhinorrhea, no sore throat, no jaw pain, no hoarseness,  no bleeding    Neck:     no node tenderness , not rigid, no masses   Respiratory:           no cough, no sputum, no coughing blood, no pleuritic , no chest pain, no dyspnea,  no wheezing  Cardiovascular:         no angina, no chest pain  No syncope, no pedal edema , no orthopnea, no PND, no palpitations, no claudication  Gastrointestinal  no nausea, no vomiting, no heartburn, no diarrhea, no constipation, no bloating, no abdominal pain, no rectal pain, no bleeding no hemorrhoids, no hernia. Genitourinary:            no urinary urgency, no frequency, no dysuria, no nocturia, no hesitancy, no  incontinence, no bleeding, no stones  Musculoskeletal:        no arthritis, no  arthralgia, no myalgia, no  weakness,  no morning stiffness, no joint swelling   Neurologic:                 no paralysis, no paresis, no  paresthesia, no seizures, no tremors, no headaches, no tumors , no stroke, no speech issues,  No incoordination, no head trauma, no memory loss/concentration  Hematologic:              no anemia, no abnormal bleeding / bruising, no fever, no chills, no night sweats, no wollen glands, no unexplained weight loss  Endocrine:        no heat or cold intolerance and no polyphagia, polydipsia, or polyuria  Psych:  Denies anxiety or depression. Physical Exam:  Patient is an 79 y.o. female     Constitutional:  General Appearance: Well-appearing. She remains significantly overweight. Level of Distress: NAD. Ambulation: ambulating normally    Psychiatric:  Insight: good judgment: Mental status: normal mood and affect. Orientation: oriented to time place and person. Memory: recent memory normal and remote memory normal.     Head: normocephalic and atraumatic. Eyes:  Lids and Conjunctiva: Non-injected and no pallor. Pupils: PERRLA, Corneas: grossly intact. EOMI, Lens: clear. Sclerae: non-icteric. Vision: peripheral vision grossly intact and acuity grossly intact. ENMT:  Ears: no lesions on external ear. TMs clear and TM mobility normal.  Hearing: no hearing loss. Nose: No lesions on external nose, septal deviation sinus tenderness or nasal discharge and nares patent and nasal passages clear. Lips, Teeth and Gums:  no mouth or lip ulcers or bleeding gums and normal dentition. Oropharynx: no erythema or exudates and moist mucous membranes and tonsils not enlarged. Neck:  Neck supple, FROM, trachea midline, and no masses. Lymph nodes: no cervical LAD, supraclavicular LAD, axillary LAD, or inguinal LAD. Thyroid: non-tender and no enlargement. Lungs:  Respiratory effort, no dyspnea. Auscultation: no rales/crackles or rhonchi and breath sounds normal, good air movement and CTA except as noted. Cardiovascular: Apical impulse:not displaced. Heart: Auscultation: normal S1 and S2, no murmurs, rubs or gallops; and RRR. Neck vessels: no carotid bruits. Pulses including femoral/pedal: normal throughout. Abdomen: Bowel sounds: normal.  Inspection and Palpation: no tenderness, guarding, masses, rebound tenderness or CVA tenderness and soft and non-distended. Liver: non-tender and no hepatomegaly. Spleen: non-tender and no splenomegaly. Hernia: none palpable.     Musculoskeletal: Motor Strength and Tone: normal tone and motor simvastatin 20 mg daily. Class 1 obesity due to excess calories with serious comorbidity and body mass index (BMI) of 33.0 to 33.9 in adult, with improvement of her BMI since last visit. History of colon polyps, last colonoscopy 6 years ago. Patient declines further colonoscopies or Cologuard test.    Normochromic normocytic anemia, mild and chronic. Discussion Notes: She will continue all her usual meds and supplements the same as listed on her med list.  She is encouraged to try to follow a low-cholesterol, low refined carbohydrate, heart healthy diet. She will get her labs done and will make further recommendations depending on results. Otherwise return in 6 months for routine 6-month follow-up visit and labs. no

## 2022-06-09 NOTE — ED ADULT NURSE NOTE - CHIEF COMPLAINT QUOTE
Allergic rhinoconjunctivitis; Eustachian tube dysfunction  Plan: Skin prick testing positive to ragweed pollen, molds 7/2018. Prior allergy injections for grass/ragweed pollen and mold with Dr. Topete for 3-4 years. He did not find these to be particularly beneficial. Sinus culture negative 5/2018.       Some nasal congestion and drainage this spring. One interval episode of cellulitis.      · Continue loratadine 10 mg daily as needed.   · May use afrin 2 sprays in each nostril 30 minutes prior to flying.   · Continue flonase 2 sprays in each nostril once daily.   · Continue astelin 2 sprays in each nostril nightly as needed. May cause drowsiness.   · Continue once daily nasal saline irrigation with distilled room temperature water mixed with saline packets.      Mild persistent asthma  Plan: Mild obstruction reversible 7/2018. Uses albuterol with exercise. Oral steroids 8/2019 and 9/2019.      Some increased albuterol use this spring.       · Continue Flovent 110 mcg 2 puffs ONCE daily. Consistent use emphasized. With increased symptoms increase to 2 puffs twice daily. (As is the case presently)  · Continue albuterol 2 puffs every 4 hours as needed should respiratory symptoms arise. May use prior to exercise.     Cerumen impaction  Summary: Bilateral ear wax buildup noted.     · Recommend removal by PCP or ENT.   · Recommend getting this done twice yearly.     Follow up in 6 month(s)     rt flank pain h/o gall stones  pain feels kidney stone  mild nausea 2dyas

## 2022-09-08 NOTE — PATIENT PROFILE ADULT. - ABILITY TO HEAR (WITH HEARING AID OR HEARING APPLIANCE IF NORMALLY USED):
Adequate: hears normal conversation without difficulty 81 M c/o right leg pain x years, worse the past week. Was told by his cardiologist dr ennis that he needs vascular studies. Had blood work done, unremarkable. Papers state needs US arterial doppler BLE. Denies fall, trauma. Had right knee replacement 3 years ago.

## 2024-08-08 NOTE — ASU PREOP CHECKLIST - BSA (M2)
[FreeTextEntry1] : #Male Hypogonadism -patient currently was taking 2 pumps of topical testosterone 5 days a week per previous regimen with Dr. Garces. Given patient's elevated free and bioavailable testosterone including his elevated estrogen levels the following regimen was started for patient. Switched to 1pump Topical testosterone daily and Arimidex 1mg q2 d patient reports his symptoms have been stable and he is satisfied with the results. - All hormone related labs reviewed today from 7/2024 were wnl - Will consider discontinuing Arimidex if his estradiol levels returned to normal limits and his T levels remain within normal limits with 1 of testosterone next visit - Testosterone, SHBG, Estrogen, LFT, CBC in 3 months He will return to see me in 3-4  months to reassess his symptoms and go over lab work 2.41

## 2025-01-23 NOTE — ED STATDOCS - PMH
Patient to ED for possible seizure. Significant other found patient on the ground shaking and not breathing right. Patient called EMS. Patient eventually woke up confused. Significant other drove patient to ER for evaluation. On arrival patient is alert and oriented to place name and year, disoriented to month    Asthma  childhood - pt states does not have aymore  HTN (hypertension)    Pancreatitis due to common bile duct stone  January 2017  Pneumonia  1 month ago

## 2025-06-08 ENCOUNTER — INPATIENT (INPATIENT)
Facility: HOSPITAL | Age: 45
LOS: 4 days | Discharge: HOME | DRG: 897 | End: 2025-06-13
Attending: HOSPITALIST | Admitting: HOSPITALIST
Payer: OTHER GOVERNMENT

## 2025-06-08 VITALS
OXYGEN SATURATION: 98 % | WEIGHT: 235.01 LBS | RESPIRATION RATE: 20 BRPM | DIASTOLIC BLOOD PRESSURE: 92 MMHG | HEART RATE: 58 BPM | SYSTOLIC BLOOD PRESSURE: 135 MMHG | TEMPERATURE: 98 F

## 2025-06-08 DIAGNOSIS — Z98.890 OTHER SPECIFIED POSTPROCEDURAL STATES: Chronic | ICD-10-CM

## 2025-06-08 DIAGNOSIS — F10.939 ALCOHOL USE, UNSPECIFIED WITH WITHDRAWAL, UNSPECIFIED: ICD-10-CM

## 2025-06-08 PROBLEM — J18.9 PNEUMONIA, UNSPECIFIED ORGANISM: Chronic | Status: ACTIVE | Noted: 2017-04-25

## 2025-06-08 PROBLEM — K85.90 ACUTE PANCREATITIS WITHOUT NECROSIS OR INFECTION, UNSPECIFIED: Chronic | Status: ACTIVE | Noted: 2017-04-25

## 2025-06-08 PROBLEM — J45.909 UNSPECIFIED ASTHMA, UNCOMPLICATED: Chronic | Status: ACTIVE | Noted: 2017-04-25

## 2025-06-08 PROBLEM — I10 ESSENTIAL (PRIMARY) HYPERTENSION: Chronic | Status: ACTIVE | Noted: 2017-11-04

## 2025-06-08 LAB
ALBUMIN SERPL ELPH-MCNC: 3.3 G/DL — SIGNIFICANT CHANGE UP (ref 3.3–5)
ALP SERPL-CCNC: 78 U/L — SIGNIFICANT CHANGE UP (ref 40–120)
ALT FLD-CCNC: 105 U/L — HIGH (ref 12–78)
AMPHET UR-MCNC: NEGATIVE — SIGNIFICANT CHANGE UP
ANION GAP SERPL CALC-SCNC: 6 MMOL/L — SIGNIFICANT CHANGE UP (ref 5–17)
ANION GAP SERPL CALC-SCNC: 9 MMOL/L — SIGNIFICANT CHANGE UP (ref 5–17)
APTT BLD: 30.1 SEC — SIGNIFICANT CHANGE UP (ref 26.1–36.8)
APTT BLD: >200 SEC — CRITICAL HIGH (ref 26.1–36.8)
AST SERPL-CCNC: 119 U/L — HIGH (ref 15–37)
BARBITURATES UR SCN-MCNC: NEGATIVE — SIGNIFICANT CHANGE UP
BASOPHILS # BLD AUTO: 0.02 K/UL — SIGNIFICANT CHANGE UP (ref 0–0.2)
BASOPHILS NFR BLD AUTO: 0.2 % — SIGNIFICANT CHANGE UP (ref 0–2)
BENZODIAZ UR-MCNC: NEGATIVE — SIGNIFICANT CHANGE UP
BILIRUB SERPL-MCNC: 2.4 MG/DL — HIGH (ref 0.2–1.2)
BUN SERPL-MCNC: 12 MG/DL — SIGNIFICANT CHANGE UP (ref 7–23)
BUN SERPL-MCNC: 16 MG/DL — SIGNIFICANT CHANGE UP (ref 7–23)
CALCIUM SERPL-MCNC: 8.3 MG/DL — LOW (ref 8.5–10.1)
CALCIUM SERPL-MCNC: 8.5 MG/DL — SIGNIFICANT CHANGE UP (ref 8.5–10.1)
CHLORIDE SERPL-SCNC: 85 MMOL/L — LOW (ref 96–108)
CHLORIDE SERPL-SCNC: 89 MMOL/L — LOW (ref 96–108)
CO2 SERPL-SCNC: 30 MMOL/L — SIGNIFICANT CHANGE UP (ref 22–31)
CO2 SERPL-SCNC: 31 MMOL/L — SIGNIFICANT CHANGE UP (ref 22–31)
COCAINE METAB.OTHER UR-MCNC: NEGATIVE — SIGNIFICANT CHANGE UP
CREAT SERPL-MCNC: 1.21 MG/DL — SIGNIFICANT CHANGE UP (ref 0.5–1.3)
CREAT SERPL-MCNC: 1.36 MG/DL — HIGH (ref 0.5–1.3)
EGFR: 65 ML/MIN/1.73M2 — SIGNIFICANT CHANGE UP
EGFR: 65 ML/MIN/1.73M2 — SIGNIFICANT CHANGE UP
EGFR: 75 ML/MIN/1.73M2 — SIGNIFICANT CHANGE UP
EGFR: 75 ML/MIN/1.73M2 — SIGNIFICANT CHANGE UP
EOSINOPHIL # BLD AUTO: 0 K/UL — SIGNIFICANT CHANGE UP (ref 0–0.5)
EOSINOPHIL NFR BLD AUTO: 0 % — SIGNIFICANT CHANGE UP (ref 0–6)
ETHANOL SERPL-MCNC: <10 MG/DL — SIGNIFICANT CHANGE UP (ref 0–10)
FENTANYL UR QL SCN: NEGATIVE — SIGNIFICANT CHANGE UP
GLUCOSE SERPL-MCNC: 118 MG/DL — HIGH (ref 70–99)
GLUCOSE SERPL-MCNC: 149 MG/DL — HIGH (ref 70–99)
HCT VFR BLD CALC: 46.9 % — SIGNIFICANT CHANGE UP (ref 39–50)
HCT VFR BLD CALC: 47.9 % — SIGNIFICANT CHANGE UP (ref 39–50)
HGB BLD-MCNC: 17.4 G/DL — HIGH (ref 13–17)
HGB BLD-MCNC: 17.7 G/DL — HIGH (ref 13–17)
IMM GRANULOCYTES # BLD AUTO: 0.04 K/UL — SIGNIFICANT CHANGE UP (ref 0–0.07)
IMM GRANULOCYTES NFR BLD AUTO: 0.4 % — SIGNIFICANT CHANGE UP (ref 0–0.9)
INR BLD: 0.97 RATIO — SIGNIFICANT CHANGE UP (ref 0.85–1.16)
LYMPHOCYTES # BLD AUTO: 1.52 K/UL — SIGNIFICANT CHANGE UP (ref 1–3.3)
LYMPHOCYTES NFR BLD AUTO: 13.6 % — SIGNIFICANT CHANGE UP (ref 13–44)
MAGNESIUM SERPL-MCNC: 2.2 MG/DL — SIGNIFICANT CHANGE UP (ref 1.6–2.6)
MCHC RBC-ENTMCNC: 33.3 PG — SIGNIFICANT CHANGE UP (ref 27–34)
MCHC RBC-ENTMCNC: 33.9 PG — SIGNIFICANT CHANGE UP (ref 27–34)
MCHC RBC-ENTMCNC: 36.3 G/DL — HIGH (ref 32–36)
MCHC RBC-ENTMCNC: 37.7 G/DL — HIGH (ref 32–36)
MCV RBC AUTO: 89.8 FL — SIGNIFICANT CHANGE UP (ref 80–100)
MCV RBC AUTO: 91.8 FL — SIGNIFICANT CHANGE UP (ref 80–100)
METHADONE UR-MCNC: NEGATIVE — SIGNIFICANT CHANGE UP
MONOCYTES # BLD AUTO: 0.84 K/UL — SIGNIFICANT CHANGE UP (ref 0–0.9)
MONOCYTES NFR BLD AUTO: 7.5 % — SIGNIFICANT CHANGE UP (ref 2–14)
NEUTROPHILS # BLD AUTO: 8.74 K/UL — HIGH (ref 1.8–7.4)
NEUTROPHILS NFR BLD AUTO: 78.3 % — HIGH (ref 43–77)
NRBC # BLD AUTO: 0 K/UL — SIGNIFICANT CHANGE UP (ref 0–0)
NRBC # BLD AUTO: 0 K/UL — SIGNIFICANT CHANGE UP (ref 0–0)
NRBC # FLD: 0 K/UL — SIGNIFICANT CHANGE UP (ref 0–0)
NRBC # FLD: 0 K/UL — SIGNIFICANT CHANGE UP (ref 0–0)
NRBC BLD AUTO-RTO: 0 /100 WBCS — SIGNIFICANT CHANGE UP (ref 0–0)
NRBC BLD AUTO-RTO: 0 /100 WBCS — SIGNIFICANT CHANGE UP (ref 0–0)
NT-PROBNP SERPL-SCNC: 134 PG/ML — HIGH (ref 0–125)
OPIATES UR-MCNC: NEGATIVE — SIGNIFICANT CHANGE UP
PCP SPEC-MCNC: SIGNIFICANT CHANGE UP
PCP UR-MCNC: NEGATIVE — SIGNIFICANT CHANGE UP
PLATELET # BLD AUTO: 124 K/UL — LOW (ref 150–400)
PLATELET # BLD AUTO: 147 K/UL — LOW (ref 150–400)
PMV BLD: 9.2 FL — SIGNIFICANT CHANGE UP (ref 7–13)
PMV BLD: 9.5 FL — SIGNIFICANT CHANGE UP (ref 7–13)
POTASSIUM SERPL-MCNC: 3.1 MMOL/L — LOW (ref 3.5–5.3)
POTASSIUM SERPL-MCNC: 3.5 MMOL/L — SIGNIFICANT CHANGE UP (ref 3.5–5.3)
POTASSIUM SERPL-SCNC: 3.1 MMOL/L — LOW (ref 3.5–5.3)
POTASSIUM SERPL-SCNC: 3.5 MMOL/L — SIGNIFICANT CHANGE UP (ref 3.5–5.3)
PROT SERPL-MCNC: 6.6 GM/DL — SIGNIFICANT CHANGE UP (ref 6–8.3)
PROTHROM AB SERPL-ACNC: 11.5 SEC — SIGNIFICANT CHANGE UP (ref 9.9–13.4)
RBC # BLD: 5.22 M/UL — SIGNIFICANT CHANGE UP (ref 4.2–5.8)
RBC # BLD: 5.22 M/UL — SIGNIFICANT CHANGE UP (ref 4.2–5.8)
RBC # FLD: 12.5 % — SIGNIFICANT CHANGE UP (ref 10.3–14.5)
RBC # FLD: 12.7 % — SIGNIFICANT CHANGE UP (ref 10.3–14.5)
SODIUM SERPL-SCNC: 124 MMOL/L — LOW (ref 135–145)
SODIUM SERPL-SCNC: 126 MMOL/L — LOW (ref 135–145)
THC UR QL: POSITIVE — SIGNIFICANT CHANGE UP
TROPONIN I, HIGH SENSITIVITY RESULT: 13.54 NG/L — SIGNIFICANT CHANGE UP
WBC # BLD: 11.16 K/UL — HIGH (ref 3.8–10.5)
WBC # BLD: 13.94 K/UL — HIGH (ref 3.8–10.5)
WBC # FLD AUTO: 11.16 K/UL — HIGH (ref 3.8–10.5)
WBC # FLD AUTO: 13.94 K/UL — HIGH (ref 3.8–10.5)

## 2025-06-08 PROCEDURE — 36415 COLL VENOUS BLD VENIPUNCTURE: CPT

## 2025-06-08 PROCEDURE — 85730 THROMBOPLASTIN TIME PARTIAL: CPT

## 2025-06-08 PROCEDURE — 76705 ECHO EXAM OF ABDOMEN: CPT | Mod: 26

## 2025-06-08 PROCEDURE — 83036 HEMOGLOBIN GLYCOSYLATED A1C: CPT

## 2025-06-08 PROCEDURE — 93458 L HRT ARTERY/VENTRICLE ANGIO: CPT

## 2025-06-08 PROCEDURE — C1894: CPT

## 2025-06-08 PROCEDURE — 80053 COMPREHEN METABOLIC PANEL: CPT

## 2025-06-08 PROCEDURE — 83735 ASSAY OF MAGNESIUM: CPT

## 2025-06-08 PROCEDURE — 85027 COMPLETE CBC AUTOMATED: CPT

## 2025-06-08 PROCEDURE — 84484 ASSAY OF TROPONIN QUANT: CPT

## 2025-06-08 PROCEDURE — 86022 PLATELET ANTIBODIES: CPT

## 2025-06-08 PROCEDURE — 80048 BASIC METABOLIC PNL TOTAL CA: CPT

## 2025-06-08 PROCEDURE — 99285 EMERGENCY DEPT VISIT HI MDM: CPT

## 2025-06-08 PROCEDURE — 99223 1ST HOSP IP/OBS HIGH 75: CPT

## 2025-06-08 PROCEDURE — 76705 ECHO EXAM OF ABDOMEN: CPT

## 2025-06-08 PROCEDURE — 84100 ASSAY OF PHOSPHORUS: CPT

## 2025-06-08 PROCEDURE — 93010 ELECTROCARDIOGRAM REPORT: CPT

## 2025-06-08 PROCEDURE — 71045 X-RAY EXAM CHEST 1 VIEW: CPT | Mod: 26

## 2025-06-08 PROCEDURE — 93306 TTE W/DOPPLER COMPLETE: CPT | Mod: 26

## 2025-06-08 PROCEDURE — 84443 ASSAY THYROID STIM HORMONE: CPT

## 2025-06-08 PROCEDURE — 80061 LIPID PANEL: CPT

## 2025-06-08 PROCEDURE — C1887: CPT

## 2025-06-08 PROCEDURE — C1769: CPT

## 2025-06-08 RX ORDER — B1/B2/B3/B5/B6/B12/VIT C/FOLIC 500-0.5 MG
1 TABLET ORAL DAILY
Refills: 0 | Status: DISCONTINUED | OUTPATIENT
Start: 2025-06-08 | End: 2025-06-12

## 2025-06-08 RX ORDER — DIAZEPAM 5 MG/1
20 TABLET ORAL
Refills: 0 | Status: DISCONTINUED | OUTPATIENT
Start: 2025-06-08 | End: 2025-06-12

## 2025-06-08 RX ORDER — DIAZEPAM 5 MG/1
TABLET ORAL
Refills: 0 | Status: COMPLETED | OUTPATIENT
Start: 2025-06-08 | End: 2025-06-11

## 2025-06-08 RX ORDER — DIAZEPAM 5 MG/1
10 TABLET ORAL
Refills: 0 | Status: DISCONTINUED | OUTPATIENT
Start: 2025-06-08 | End: 2025-06-12

## 2025-06-08 RX ORDER — ONDANSETRON HCL/PF 4 MG/2 ML
4 VIAL (ML) INJECTION EVERY 6 HOURS
Refills: 0 | Status: DISCONTINUED | OUTPATIENT
Start: 2025-06-08 | End: 2025-06-13

## 2025-06-08 RX ORDER — FOLIC ACID 1 MG/1
1 TABLET ORAL DAILY
Refills: 0 | Status: DISCONTINUED | OUTPATIENT
Start: 2025-06-08 | End: 2025-06-12

## 2025-06-08 RX ORDER — HEPARIN SODIUM 1000 [USP'U]/ML
4000 INJECTION INTRAVENOUS; SUBCUTANEOUS EVERY 6 HOURS
Refills: 0 | Status: DISCONTINUED | OUTPATIENT
Start: 2025-06-08 | End: 2025-06-09

## 2025-06-08 RX ORDER — METOPROLOL SUCCINATE 50 MG/1
150 TABLET, EXTENDED RELEASE ORAL
Refills: 0 | Status: DISCONTINUED | OUTPATIENT
Start: 2025-06-08 | End: 2025-06-13

## 2025-06-08 RX ORDER — CYST/ALA/Q10/PHOS.SER/DHA/BROC 100-20-50
15 POWDER (GRAM) ORAL DAILY
Refills: 0 | Status: DISCONTINUED | OUTPATIENT
Start: 2025-06-08 | End: 2025-06-13

## 2025-06-08 RX ORDER — HEPARIN SODIUM 1000 [USP'U]/ML
INJECTION INTRAVENOUS; SUBCUTANEOUS
Qty: 25000 | Refills: 0 | Status: DISCONTINUED | OUTPATIENT
Start: 2025-06-08 | End: 2025-06-09

## 2025-06-08 RX ORDER — LORAZEPAM 4 MG/ML
2 VIAL (ML) INJECTION
Refills: 0 | Status: DISCONTINUED | OUTPATIENT
Start: 2025-06-08 | End: 2025-06-08

## 2025-06-08 RX ORDER — ACETAMINOPHEN 500 MG/5ML
650 LIQUID (ML) ORAL EVERY 6 HOURS
Refills: 0 | Status: DISCONTINUED | OUTPATIENT
Start: 2025-06-08 | End: 2025-06-13

## 2025-06-08 RX ORDER — DIAZEPAM 5 MG/1
10 TABLET ORAL EVERY 6 HOURS
Refills: 0 | Status: DISCONTINUED | OUTPATIENT
Start: 2025-06-08 | End: 2025-06-09

## 2025-06-08 RX ORDER — HEPARIN SODIUM 1000 [USP'U]/ML
9000 INJECTION INTRAVENOUS; SUBCUTANEOUS ONCE
Refills: 0 | Status: COMPLETED | OUTPATIENT
Start: 2025-06-08 | End: 2025-06-08

## 2025-06-08 RX ORDER — LORAZEPAM 4 MG/ML
2 VIAL (ML) INJECTION ONCE
Refills: 0 | Status: DISCONTINUED | OUTPATIENT
Start: 2025-06-08 | End: 2025-06-08

## 2025-06-08 RX ORDER — HEPARIN SODIUM 1000 [USP'U]/ML
9000 INJECTION INTRAVENOUS; SUBCUTANEOUS EVERY 6 HOURS
Refills: 0 | Status: DISCONTINUED | OUTPATIENT
Start: 2025-06-08 | End: 2025-06-09

## 2025-06-08 RX ORDER — DIAZEPAM 5 MG/1
10 TABLET ORAL EVERY 8 HOURS
Refills: 0 | Status: DISCONTINUED | OUTPATIENT
Start: 2025-06-09 | End: 2025-06-10

## 2025-06-08 RX ORDER — DIAZEPAM 5 MG/1
10 TABLET ORAL
Refills: 0 | Status: DISCONTINUED | OUTPATIENT
Start: 2025-06-08 | End: 2025-06-08

## 2025-06-08 RX ORDER — FOLIC ACID 1 MG/1
1 TABLET ORAL ONCE
Refills: 0 | Status: COMPLETED | OUTPATIENT
Start: 2025-06-08 | End: 2025-06-08

## 2025-06-08 RX ORDER — DIAZEPAM 5 MG/1
10 TABLET ORAL EVERY 12 HOURS
Refills: 0 | Status: DISCONTINUED | OUTPATIENT
Start: 2025-06-11 | End: 2025-06-11

## 2025-06-08 RX ADMIN — Medication 1000 MILLILITER(S): at 10:26

## 2025-06-08 RX ADMIN — Medication 15 MILLILITER(S): at 15:11

## 2025-06-08 RX ADMIN — Medication 40 MILLIEQUIVALENT(S): at 12:48

## 2025-06-08 RX ADMIN — Medication 50 MILLILITER(S): at 15:23

## 2025-06-08 RX ADMIN — HEPARIN SODIUM 0 UNIT(S)/HR: 1000 INJECTION INTRAVENOUS; SUBCUTANEOUS at 19:30

## 2025-06-08 RX ADMIN — HEPARIN SODIUM 1900 UNIT(S)/HR: 1000 INJECTION INTRAVENOUS; SUBCUTANEOUS at 12:01

## 2025-06-08 RX ADMIN — FOLIC ACID 1 MILLIGRAM(S): 1 TABLET ORAL at 15:11

## 2025-06-08 RX ADMIN — Medication 100 MILLIGRAM(S): at 10:25

## 2025-06-08 RX ADMIN — Medication 100 MILLIGRAM(S): at 15:11

## 2025-06-08 RX ADMIN — FOLIC ACID 1 MILLIGRAM(S): 1 TABLET ORAL at 12:56

## 2025-06-08 RX ADMIN — DIAZEPAM 10 MILLIGRAM(S): 5 TABLET ORAL at 18:34

## 2025-06-08 RX ADMIN — HEPARIN SODIUM 9000 UNIT(S): 1000 INJECTION INTRAVENOUS; SUBCUTANEOUS at 12:03

## 2025-06-08 RX ADMIN — HEPARIN SODIUM 1600 UNIT(S)/HR: 1000 INJECTION INTRAVENOUS; SUBCUTANEOUS at 20:39

## 2025-06-08 RX ADMIN — Medication 2 MILLIGRAM(S): at 10:24

## 2025-06-08 RX ADMIN — DIAZEPAM 10 MILLIGRAM(S): 5 TABLET ORAL at 15:11

## 2025-06-08 RX ADMIN — METOPROLOL SUCCINATE 150 MILLIGRAM(S): 50 TABLET, EXTENDED RELEASE ORAL at 22:03

## 2025-06-08 RX ADMIN — Medication 40 MILLIEQUIVALENT(S): at 12:04

## 2025-06-08 RX ADMIN — METOPROLOL SUCCINATE 150 MILLIGRAM(S): 50 TABLET, EXTENDED RELEASE ORAL at 12:48

## 2025-06-08 RX ADMIN — HEPARIN SODIUM 1600 UNIT(S)/HR: 1000 INJECTION INTRAVENOUS; SUBCUTANEOUS at 20:43

## 2025-06-08 RX ADMIN — HEPARIN SODIUM 1900 UNIT(S)/HR: 1000 INJECTION INTRAVENOUS; SUBCUTANEOUS at 15:19

## 2025-06-08 RX ADMIN — HEPARIN SODIUM 1900 UNIT(S)/HR: 1000 INJECTION INTRAVENOUS; SUBCUTANEOUS at 13:12

## 2025-06-08 NOTE — ED PROVIDER NOTE - PROGRESS NOTE DETAILS
Patient with initial CIWA score of 8, now down to a 3.  Reviewed results and plan with patient.  Patient at first adverse to being admitted however is now agreeable.  Will start heparin for new A-fib.  Will consult cardiology.  Second dose of benzodiazepine ordered. MD LEE

## 2025-06-08 NOTE — H&P ADULT - ASSESSMENT
"45 year old male with PMHx of cardiomyopathy, HTN; presents to ED c/o chest pain. Patient states he thinks he is experiencing EtOH withdrawal symptoms.  Patient states he was drinking EtOH for about 11 days and recently stopped about 2 days ago. Last drink, friday evening. Patient states he has done this in the past and feels like his withdrawal symptoms worsen over the years.  Denies history of withdrawal seizures. Former smoker. Does not have cardiologist."    44 yo M with pmh etoh abuse, HTN, cardiomyopathy, h/o etoh pancreatitis , chronic transaminitis p/w vague pleuritic chest discomfort and presented after he was experiencing withdrawal ssx. Consumed large amounts of etoh during the last 2 weeks (last drink 2 days ago) and at risk for impending w/d - denies h/o DTs. Scored 8 on arrival, s/p valium, now a 2.   Pt found to be in new onset rapid afib with rates in 140s -> s/p 1L NS, rates in 90s. Heparin gtt started.     Serum Na: 124  Serum K: 3.1  Scr: 1.4  Vitals stable  Denies neurological deficits.   Started on thiamine, MVI and potassium repleted      #New onset afib with rvr (volume depletion suspected)  #Hyponatremia/Hypokalemia  #ETOH withdrawal  #NOHEMI  #HTN with cardiomyopathy (EF unknown)  - admit to tele  - agree with heparin gtt : CHADS VASC: 2 (HTN, cardiomyopathy)  - start cardizem 30 po q6h with holding parameters  - cont gentle ivf and monitor fluid status  - TTE  - K repleted in Ed  - repeat chemistry in 6 hous, avoid rapid correction of NA  - goal Na correction 6-8 meq in 24 hours  - continue ciwa scale/diazepam taper  - mvi, thiamine, folate  - cardio consult  - hold nephrotoxins  - chronic transaminitis seen on outside records on [facility]IE dating back to 2019, no labs since then  - prior MRI in 2019 identified hepatic steatosis  - obtain RUQ sono  - GI consult as o/p  - DF: -2, good prognosis, no steroids  - Diet: LFD      Full code    Time spent:  79 min spent during this encounter including but not limited to chart review, external record review,  labs/imaging (both on independent interpretation and official review by radiology), medication reconciliation,  coordination of care and discussion with consultants. (Not including goc discussion)  Further tests such as imaging and blood test, medications, and procedures have been ordered as indicated. Laboratory results and the plan of care were communicated to the patient and family.    Time spent was including but not limited to:  - Reviewing, and interpreting labs and testing.  - Independently obtaining a review of systems and performing a physical exam  - Reviewing consultant documentation/recommendations in addition to discussing plan of care with consultants.  - Counselling and educating patient and family regarding interpretation of aforementioned items and plan of care.

## 2025-06-08 NOTE — ED PROVIDER NOTE - NSICDXPASTMEDICALHX_GEN_ALL_CORE_FT
PAST MEDICAL HISTORY:  Asthma childhood - pt states does not have aymore    HTN (hypertension)     Pancreatitis due to common bile duct stone January 2017    Pneumonia 1 month ago

## 2025-06-08 NOTE — ED PROVIDER NOTE - CARE PLAN
1 Principal Discharge DX:	Alcohol withdrawal  Secondary Diagnosis:	New onset atrial fibrillation  Secondary Diagnosis:	Hypokalemia

## 2025-06-08 NOTE — ED ADULT TRIAGE NOTE - CHIEF COMPLAINT QUOTE
Pt ambulatory to ED w/ chest pain since last night. States he stopped drinking 2 days ago. Pt endorses he was drinking 1.5 bottles of wine a day for 10 days. Appears diaphoretic and tremulous in triage. Hx of HTN.

## 2025-06-08 NOTE — H&P ADULT - NSHPPHYSICALEXAM_GEN_ALL_CORE
ICU Vital Signs Last 24 Hrs  T(C): 36.7 (08 Jun 2025 11:25), Max: 36.8 (08 Jun 2025 09:35)  T(F): 98 (08 Jun 2025 11:25), Max: 98.3 (08 Jun 2025 09:35)  HR: 93 (08 Jun 2025 11:25) (58 - 99)  BP: 111/81 (08 Jun 2025 11:25) (111/81 - 143/66)  BP(mean): 90 (08 Jun 2025 11:25) (82 - 103)  ABP: --  ABP(mean): --  RR: 20 (08 Jun 2025 11:25) (18 - 20)  SpO2: 99% (08 Jun 2025 11:25) (98% - 99%)    O2 Parameters below as of 08 Jun 2025 11:25  Patient On (Oxygen Delivery Method): room air            PHYSICAL EXAM:    Constitutional: NAD, awake and alert  HEENT: PERR, EOMI, Normal Hearing, MMM  Neck: Soft and supple, No LAD, No JVD  Respiratory: Breath sounds are clear bilaterally, No wheezing, rales or rhonchi  Cardiovascular: S1 and S2, regular rate and rhythm, no Murmurs, gallops or rubs  Gastrointestinal: Bowel Sounds present, soft, nontender, nondistended, no guarding, no rebound  Extremities: No peripheral edema  Vascular: 2+ peripheral pulses  Neurological: A/O x 3, no focal deficits  Musculoskeletal: 5/5 strength b/l upper and lower extremities  Skin: No rashes

## 2025-06-08 NOTE — ED ADULT NURSE NOTE - NSFALLRISKINTERV_ED_ALL_ED
Assistance OOB with selected safe patient handling equipment if applicable/Assistance with ambulation/Communicate fall risk and risk factors to all staff, patient, and family/Monitor gait and stability/Monitor for mental status changes and reorient to person, place, and time, as needed/Provide visual cue: yellow wristband, yellow gown, etc/Reinforce activity limits and safety measures with patient and family/Toileting schedule using arm’s reach rule for commode and bathroom/Use of alarms - bed, stretcher, chair and/or video monitoring/Call bell, personal items and telephone in reach/Instruct patient to call for assistance before getting out of bed/chair/stretcher/Non-slip footwear applied when patient is off stretcher/[place] to call system/Physically safe environment - no spills, clutter or unnecessary equipment/Purposeful Proactive Rounding/Room/bathroom lighting operational, light cord in reach

## 2025-06-08 NOTE — ED PROVIDER NOTE - OBJECTIVE STATEMENT
45 year old male with PMHx of cardiomyopathy, HTN; presents to ED c/o chest pain. Patient states he thinks he is experiencing EtOH withdrawal symptoms.  Patient states he was drinking EtOH for about 11 days and recently stopped about 2 days ago. Last drink, friday evening. Patient states he has done this in the past and feels like his withdrawal symptoms worsen over the years.  Denies history of withdrawal seizures. Former smoker. Does not have cardiologist.    On Metoprolol, Amlodipine, HCTZ  PCP Dr Boxer

## 2025-06-08 NOTE — ED PROVIDER NOTE - PHYSICAL EXAMINATION
Constitutional: NAD AOx3, diaphoretic, tremulous  Eyes: PERRL EOMI  Head: Normocephalic atraumatic  Mouth: MMM  Cardiac: tachycardic, irregular  Resp: Lungs CTAB  GI: Abd s/nd/nt  Neuro: CN2-12 grossly intact, ROJO x 4  Skin: No visible rashes

## 2025-06-08 NOTE — PATIENT PROFILE ADULT - FUNCTIONAL ASSESSMENT - DAILY ACTIVITY 2.
Detail Level: Detailed Add 69858 Cpt? (Important Note: In 2017 The Use Of 88486 Is Being Tracked By Cms To Determine Future Global Period Reimbursement For Global Periods): yes 4 = No assist / stand by assistance

## 2025-06-08 NOTE — ED PROVIDER NOTE - QTC
----- Message from Chanel Pulido sent at 9/3/2019  4:15 PM CDT -----  Contact: Self  Type: Patient Call Back    Who called:Self    What is the request in detail: He states he has another injection,but does not know the name but needs to get schedule.    Can the clinic reply by [facility]?No    Would the patient rather a call back or a response via [facility]? Callback    Best call back number:[phone]     [phone]

## 2025-06-08 NOTE — ED PROVIDER NOTE - CLINICAL SUMMARY MEDICAL DECISION MAKING FREE TEXT BOX
Plan treatment EtOH withdrawal, labs, IV fluids, Ativan. Patient is new onset Afib. Will likely need admit for new AF and EtOH withdrawal

## 2025-06-08 NOTE — ED ADULT NURSE REASSESSMENT NOTE - NS ED NURSE REASSESS COMMENT FT1
pharmacy call x2  for open meds. pharmacy stated they are making meds and will bring them up when ready. MD aware

## 2025-06-08 NOTE — PATIENT PROFILE ADULT - FALL HARM RISK - HARM RISK INTERVENTIONS
Assistance with ambulation/Assistance OOB with selected safe patient handling equipment/Communicate Risk of Fall with Harm to all staff/Monitor for mental status changes/Monitor gait and stability/Reinforce activity limits and safety measures with patient and family/Review medications for side effects contributing to fall risk/Sit up slowly, dangle for a short time, stand at bedside before walking/Tailored Fall Risk Interventions/Toileting schedule using arm’s reach rule for commode and bathroom/Use of alarms - bed, chair and/or voice tab/Visual Cue: Yellow wristband and red socks/Bed in lowest position, wheels locked, appropriate side rails in place/Call bell, personal items and telephone in reach/Instruct patient to call for assistance before getting out of bed or chair/Non-slip footwear when patient is out of bed/[place] to call system/Physically safe environment - no spills, clutter or unnecessary equipment/Purposeful Proactive Rounding/Room/bathroom lighting operational, light cord in reach

## 2025-06-08 NOTE — ED ADULT NURSE NOTE - OBJECTIVE STATEMENT
Pt ambulatory to ED w/ chest pain since last night. States he stopped drinking 2 days ago. Pt endorses he was drinking 1.5 bottles of wine a day for 10 days. Appears diaphoretic and tremulous in triage. Hx of HTN. no other complaints

## 2025-06-08 NOTE — H&P ADULT - NSHPLABSRESULTS_GEN_ALL_CORE
LABS: All Labs Reviewed:                        17.7   11.16 )-----------( 147      ( 08 Jun 2025 10:13 )             46.9     06-08    124[L]  |  85[L]  |  16  ----------------------------<  149[H]  3.1[L]   |  30  |  1.36[H]    Ca    8.3[L]      08 Jun 2025 10:13  Mg     2.2     06-08    TPro  6.6  /  Alb  3.3  /  TBili  2.4[H]  /  DBili  x   /  AST  119[H]  /  ALT  105[H]  /  AlkPhos  78  06-08    PT/INR - ( 08 Jun 2025 10:13 )   PT: 11.5 sec;   INR: 0.97 ratio         PTT - ( 08 Jun 2025 10:13 )  PTT:30.1 sec          Blood Culture:             < from: Xray Chest 1 View- PORTABLE-Urgent (06.08.25 @ 10:48) >    IMPRESSION:    Unremarkable frontal chest x ray    --- End of Report ---    < end of copied text >

## 2025-06-08 NOTE — H&P ADULT - HISTORY OF PRESENT ILLNESS
"45 year old male with PMHx of cardiomyopathy, HTN; presents to ED c/o chest pain. Patient states he thinks he is experiencing EtOH withdrawal symptoms.  Patient states he was drinking EtOH for about 11 days and recently stopped about 2 days ago. Last drink, friday evening. Patient states he has done this in the past and feels like his withdrawal symptoms worsen over the years.  Denies history of withdrawal seizures. Former smoker. Does not have cardiologist."    46 yo M with pmh etoh abuse, HTN, cardiomyopathy, h/o etoh pancreatitis , chronic transaminitis p/w vague pleuritic chest discomfort and presented after he was experiencing withdrawal ssx. Consumed large amounts of etoh during the last 2 weeks (last drink 2 days ago) and at risk for impending w/d - denies h/o DTs. Scored 8 on arrival, s/p valium, now a 2.   Pt found to be in new onset rapid afib with rates in 140s -> s/p 1L NS, rates in 90s. Heparin gtt started.     Serum Na: 124  Serum K: 3.1  Scr: 1.4  Vitals stable  Denies neurological deficits.   Started on thiamine, MVI and potassium repleted        PAST MEDICAL/SURGICAL/FAMILY/SOCIAL HISTORY:    Past Medical, Past Surgical, and Family History:  PAST MEDICAL HISTORY:  Asthma childhood - pt states does not have aymore    HTN (hypertension)     Pancreatitis due to common bile duct stone January 2017    Pneumonia 1 month ago.     PAST SURGICAL HISTORY:  S/P right knee arthroscopy 2 in 2003  1 in 2004.     FAMILY HISTORY:  No pertinent family history in first degree relatives.     Tobacco Usage:  · Tobacco Usage	Former smoker

## 2025-06-09 LAB
ALBUMIN SERPL ELPH-MCNC: 3.3 G/DL — SIGNIFICANT CHANGE UP (ref 3.3–5)
ALP SERPL-CCNC: 51 U/L — SIGNIFICANT CHANGE UP (ref 40–120)
ALT FLD-CCNC: 69 U/L — SIGNIFICANT CHANGE UP (ref 12–78)
ANION GAP SERPL CALC-SCNC: 7 MMOL/L — SIGNIFICANT CHANGE UP (ref 5–17)
APTT BLD: 72.2 SEC — HIGH (ref 26.1–36.8)
APTT BLD: 88.1 SEC — HIGH (ref 26.1–36.8)
AST SERPL-CCNC: 63 U/L — HIGH (ref 15–37)
BILIRUB SERPL-MCNC: 1.7 MG/DL — HIGH (ref 0.2–1.2)
BUN SERPL-MCNC: 10 MG/DL — SIGNIFICANT CHANGE UP (ref 7–23)
CALCIUM SERPL-MCNC: 8.8 MG/DL — SIGNIFICANT CHANGE UP (ref 8.5–10.1)
CHLORIDE SERPL-SCNC: 94 MMOL/L — LOW (ref 96–108)
CO2 SERPL-SCNC: 27 MMOL/L — SIGNIFICANT CHANGE UP (ref 22–31)
CREAT SERPL-MCNC: 1.1 MG/DL — SIGNIFICANT CHANGE UP (ref 0.5–1.3)
EGFR: 84 ML/MIN/1.73M2 — SIGNIFICANT CHANGE UP
EGFR: 84 ML/MIN/1.73M2 — SIGNIFICANT CHANGE UP
GLUCOSE SERPL-MCNC: 104 MG/DL — HIGH (ref 70–99)
HCT VFR BLD CALC: 47.2 % — SIGNIFICANT CHANGE UP (ref 39–50)
HGB BLD-MCNC: 17.1 G/DL — HIGH (ref 13–17)
IMMATURE PLATELET FRACTION #: 3.3 K/UL — LOW (ref 3.9–12.5)
IMMATURE PLATELET FRACTION %: 3.5 % — SIGNIFICANT CHANGE UP (ref 1.6–7.1)
MCHC RBC-ENTMCNC: 33.7 PG — SIGNIFICANT CHANGE UP (ref 27–34)
MCHC RBC-ENTMCNC: 36.2 G/DL — HIGH (ref 32–36)
MCV RBC AUTO: 92.9 FL — SIGNIFICANT CHANGE UP (ref 80–100)
NRBC # BLD AUTO: 0 K/UL — SIGNIFICANT CHANGE UP (ref 0–0)
NRBC # FLD: 0 K/UL — SIGNIFICANT CHANGE UP (ref 0–0)
NRBC BLD AUTO-RTO: 0 /100 WBCS — SIGNIFICANT CHANGE UP (ref 0–0)
PLATELET # BLD AUTO: 94 K/UL — LOW (ref 150–400)
PMV BLD: 9.7 FL — SIGNIFICANT CHANGE UP (ref 7–13)
POTASSIUM SERPL-MCNC: 3.3 MMOL/L — LOW (ref 3.5–5.3)
POTASSIUM SERPL-SCNC: 3.3 MMOL/L — LOW (ref 3.5–5.3)
PROT SERPL-MCNC: 7 GM/DL — SIGNIFICANT CHANGE UP (ref 6–8.3)
RBC # BLD: 5.08 M/UL — SIGNIFICANT CHANGE UP (ref 4.2–5.8)
RBC # FLD: 13.1 % — SIGNIFICANT CHANGE UP (ref 10.3–14.5)
SODIUM SERPL-SCNC: 128 MMOL/L — LOW (ref 135–145)
TROPONIN I, HIGH SENSITIVITY RESULT: 6.06 NG/L — SIGNIFICANT CHANGE UP
TSH SERPL-MCNC: 1.19 UU/ML — SIGNIFICANT CHANGE UP (ref 0.34–4.82)
WBC # BLD: 10.9 K/UL — HIGH (ref 3.8–10.5)
WBC # FLD AUTO: 10.9 K/UL — HIGH (ref 3.8–10.5)

## 2025-06-09 PROCEDURE — 99223 1ST HOSP IP/OBS HIGH 75: CPT

## 2025-06-09 PROCEDURE — 99233 SBSQ HOSP IP/OBS HIGH 50: CPT

## 2025-06-09 RX ORDER — HEPARIN SODIUM 1000 [USP'U]/ML
9000 INJECTION INTRAVENOUS; SUBCUTANEOUS EVERY 6 HOURS
Refills: 0 | Status: DISCONTINUED | OUTPATIENT
Start: 2025-06-09 | End: 2025-06-09

## 2025-06-09 RX ORDER — HEPARIN SODIUM 1000 [USP'U]/ML
INJECTION INTRAVENOUS; SUBCUTANEOUS
Qty: 25000 | Refills: 0 | Status: DISCONTINUED | OUTPATIENT
Start: 2025-06-09 | End: 2025-06-09

## 2025-06-09 RX ORDER — HEPARIN SODIUM 1000 [USP'U]/ML
9000 INJECTION INTRAVENOUS; SUBCUTANEOUS EVERY 6 HOURS
Refills: 0 | Status: DISCONTINUED | OUTPATIENT
Start: 2025-06-09 | End: 2025-06-12

## 2025-06-09 RX ORDER — HEPARIN SODIUM 1000 [USP'U]/ML
1600 INJECTION INTRAVENOUS; SUBCUTANEOUS
Qty: 25000 | Refills: 0 | Status: DISCONTINUED | OUTPATIENT
Start: 2025-06-09 | End: 2025-06-12

## 2025-06-09 RX ORDER — ENOXAPARIN SODIUM 100 MG/ML
110 INJECTION SUBCUTANEOUS EVERY 12 HOURS
Refills: 0 | Status: DISCONTINUED | OUTPATIENT
Start: 2025-06-09 | End: 2025-06-09

## 2025-06-09 RX ORDER — HEPARIN SODIUM 1000 [USP'U]/ML
4000 INJECTION INTRAVENOUS; SUBCUTANEOUS EVERY 6 HOURS
Refills: 0 | Status: DISCONTINUED | OUTPATIENT
Start: 2025-06-09 | End: 2025-06-12

## 2025-06-09 RX ORDER — METOPROLOL SUCCINATE 50 MG/1
5 TABLET, EXTENDED RELEASE ORAL EVERY 6 HOURS
Refills: 0 | Status: DISCONTINUED | OUTPATIENT
Start: 2025-06-09 | End: 2025-06-13

## 2025-06-09 RX ORDER — HEPARIN SODIUM 1000 [USP'U]/ML
4000 INJECTION INTRAVENOUS; SUBCUTANEOUS EVERY 6 HOURS
Refills: 0 | Status: DISCONTINUED | OUTPATIENT
Start: 2025-06-09 | End: 2025-06-09

## 2025-06-09 RX ADMIN — HEPARIN SODIUM 1600 UNIT(S)/HR: 1000 INJECTION INTRAVENOUS; SUBCUTANEOUS at 23:30

## 2025-06-09 RX ADMIN — METOPROLOL SUCCINATE 150 MILLIGRAM(S): 50 TABLET, EXTENDED RELEASE ORAL at 22:21

## 2025-06-09 RX ADMIN — DIAZEPAM 10 MILLIGRAM(S): 5 TABLET ORAL at 11:06

## 2025-06-09 RX ADMIN — DIAZEPAM 10 MILLIGRAM(S): 5 TABLET ORAL at 05:56

## 2025-06-09 RX ADMIN — DIAZEPAM 10 MILLIGRAM(S): 5 TABLET ORAL at 00:05

## 2025-06-09 RX ADMIN — METOPROLOL SUCCINATE 5 MILLIGRAM(S): 50 TABLET, EXTENDED RELEASE ORAL at 20:06

## 2025-06-09 RX ADMIN — Medication 15 MILLILITER(S): at 11:05

## 2025-06-09 RX ADMIN — Medication 100 MILLIGRAM(S): at 10:54

## 2025-06-09 RX ADMIN — FOLIC ACID 1 MILLIGRAM(S): 1 TABLET ORAL at 10:53

## 2025-06-09 RX ADMIN — Medication 40 MILLIEQUIVALENT(S): at 17:46

## 2025-06-09 RX ADMIN — HEPARIN SODIUM 1600 UNIT(S)/HR: 1000 INJECTION INTRAVENOUS; SUBCUTANEOUS at 03:21

## 2025-06-09 RX ADMIN — HEPARIN SODIUM 1600 UNIT(S)/HR: 1000 INJECTION INTRAVENOUS; SUBCUTANEOUS at 07:24

## 2025-06-09 RX ADMIN — METOPROLOL SUCCINATE 150 MILLIGRAM(S): 50 TABLET, EXTENDED RELEASE ORAL at 10:49

## 2025-06-09 RX ADMIN — Medication 40 MILLIEQUIVALENT(S): at 14:13

## 2025-06-09 RX ADMIN — DIAZEPAM 10 MILLIGRAM(S): 5 TABLET ORAL at 22:21

## 2025-06-09 RX ADMIN — Medication 1 TABLET(S): at 10:53

## 2025-06-09 RX ADMIN — ENOXAPARIN SODIUM 110 MILLIGRAM(S): 100 INJECTION SUBCUTANEOUS at 11:37

## 2025-06-09 NOTE — PROGRESS NOTE ADULT - ASSESSMENT
"45 year old male with PMHx of cardiomyopathy, HTN; presents to ED c/o chest pain. Patient states he thinks he is experiencing EtOH withdrawal symptoms.  Patient states he was drinking EtOH for about 11 days and recently stopped about 2 days ago. Last drink, friday evening. Patient states he has done this in the past and feels like his withdrawal symptoms worsen over the years.  Denies history of withdrawal seizures. Former smoker. Does not have cardiologist."    46 yo M with pmh etoh abuse, HTN, cardiomyopathy, h/o etoh pancreatitis , chronic transaminitis p/w vague pleuritic chest discomfort and presented after he was experiencing withdrawal ssx. Consumed large amounts of etoh during the last 2 weeks (last drink 2 days ago) and at risk for impending w/d - denies h/o DTs. Scored 8 on arrival, s/p valium, now a 2.   Pt found to be in new onset rapid afib with rates in 140s -> s/p 1L NS, rates in 90s. Heparin gtt started.     Serum Na: 124  Serum K: 3.1  Scr: 1.4  Vitals stable  Denies neurological deficits.   Started on thiamine, MVI and potassium repleted      #New onset afib with rvr (volume depletion suspected)      #Hyponatremia/Hypokalemia  #ETOH withdrawal  #NOHEMI  #HTN with cardiomyopathy (EF unknown)  - admit to tele  - agree with heparin gtt : CHADS VASC: 2 (HTN, cardiomyopathy)  - start cardizem 30 po q6h with holding parameters  - cont gentle ivf and monitor fluid status  - TTE  - K repleted in Ed  - repeat chemistry in 6 hous, avoid rapid correction of NA  - goal Na correction 6-8 meq in 24 hours  - continue ciwa scale/diazepam taper  - mvi, thiamine, folate  - cardio consult  - hold nephrotoxins  - chronic transaminitis seen on outside records on [facility] dating back to 2019, no labs since then  - prior MRI in 2019 identified hepatic steatosis  - obtain RUQ sono  - GI consult as o/p  - DF: -2, good prognosis, no steroids  - Diet: LFD       "45 year old male with PMHx of cardiomyopathy, HTN; presents to ED c/o chest pain. Patient states he thinks he is experiencing EtOH withdrawal symptoms.  Patient states he was drinking EtOH for about 11 days and recently stopped about 2 days ago. Last drink, friday evening. Patient states he has done this in the past and feels like his withdrawal symptoms worsen over the years.  Denies history of withdrawal seizures. Former smoker. Does not have cardiologist."    44 yo M with pmh etoh abuse, HTN, cardiomyopathy, h/o etoh pancreatitis , chronic transaminitis p/w vague pleuritic chest discomfort and presented after he was experiencing withdrawal ssx. Consumed large amounts of etoh during the last 2 weeks (last drink 2 days ago) and at risk for impending w/d - denies h/o DTs. Scored 8 on arrival, s/p valium, now a 2.   Pt found to be in new onset rapid afib with rates in 140s -> s/p 1L NS, rates in 90s. Heparin gtt started.     Serum Na: 124  Serum K: 3.1  Scr: 1.4  Vitals stable  Denies neurological deficits.   Started on thiamine, MVI and potassium repleted      #New onset afib with rvr (volume depletion suspected)  #r/o CAD  - tele & EKG w a fib  - c/w metoprolol 150mg BID (home med)  - IVF  - monitor lytes  - CHADS VASC: 2 (HTN, cardiomyopathy) > heparin drip for now, possible cath  - cardio consult appreciated  - monitor lytes      #Hyponatremia/Hypokalemia  - suspect 2/2 alcohol use, poor PO intake   - goal Na correction 6-8 meq in 24 hours  - Na 128 this AM   - replete PRN       #NOHEMI  - suspect pre-renal  - 1.3 now 1.1 s/p IVF      #HTN with cardiomyopathy (EF unknown)  - TTE  - c/w metoprolol  - cardio consult, possible cath tomorrow         #chronic transaminitis seen on outside records on [facility]IE dating back to 2019, no labs since then  - prior MRI in 2019 identified hepatic steatosis  - obtain RUQ sono  - GI consult as o/p    #thrombocytopenia  - Last labs outpatient 2017 show flucuating platelets from normal to low 130-140  - 147 on admission now 93 since initiating heparin (<50% drop)  - HIT score 3 points : low probability of HIT  - suspect thrombytopenia is chronic to some extent given alcohol history   - no sign/sx of clots currently   - will send Heparin AB/reflex       #ETOH withdrawal  #etoh abuse  - continue ciwa scale/diazepam taper  - mvi, thiamine, folate    #dvt ppx - heparin    #dispo - rate control, possible cath in AM , f/u platelets for r/o HIT

## 2025-06-09 NOTE — PROGRESS NOTE ADULT - SUBJECTIVE AND OBJECTIVE BOX
HOSPITALIST PROGRESS NOTE      HPI - 46 yo M with pmh etoh abuse, HTN, cardiomyopathy, h/o etoh pancreatitis , chronic transaminitis p/w vague pleuritic chest discomfort and presented after he was experiencing withdrawal ssx. Consumed large amounts of etoh during the last 2 weeks (last drink 2 days ago) and at risk for impending w/d - denies h/o DTs. Scored 8 on arrival, s/p valium, now a 2.   Pt found to be in new onset rapid afib with rates in 140s -> s/p 1L NS, rates in 90s. Heparin gtt started.     6/9 - Pt feels fine, no Chest pain or SOB, HR up to 130s at times     VITALS  Vital Signs Last 24 Hrs  T(C): 36.9 (09 Jun 2025 08:00), Max: 37 (08 Jun 2025 15:00)  T(F): 98.5 (09 Jun 2025 08:00), Max: 98.6 (08 Jun 2025 15:00)  HR: 100 (09 Jun 2025 08:00) (72 - 107)  BP: 113/85 (09 Jun 2025 08:00) (96/61 - 129/69)  BP(mean): 85 (08 Jun 2025 21:00) (85 - 85)  RR: 18 (09 Jun 2025 08:00) (18 - 20)  SpO2: 96% (09 Jun 2025 08:00) (96% - 99%)    Parameters below as of 09 Jun 2025 08:00  Patient On (Oxygen Delivery Method): room air        CAPILLARY BLOOD GLUCOSE          PHYSICAL EXAM  General: NAD, sitting comfortably in bed   HEENT: EOMI, no scleral icterus, dry MM  Respiratory: lungs CTA b/l, no wheezes/crackles, no accessory muscle use  Cardiovascular: Regular rhythm/rate; +S1 +S2  Gastrointestinal: Soft, NTND  Extremities: WWP, no cyanosis, no edema, pulses equal  Neurological: A&Ox3, no gross focal deficits, follows commands  Skin: Normal temperature, warm, dry    MEDICATIONS  (STANDING):  diazepam  Injectable   IV Push   diazepam  Injectable 10 milliGRAM(s) IV Push every 8 hours  folic acid 1 milliGRAM(s) Oral daily  heparin  Infusion.  Unit(s)/Hr (19 mL/Hr) IV Continuous <Continuous>  metoprolol tartrate 150 milliGRAM(s) Oral two times a day  multivitamin 1 Tablet(s) Oral daily  multivitamin/minerals/iron Oral Solution (CENTRUM) 15 milliLiter(s) Oral daily  potassium chloride    Tablet ER 40 milliEquivalent(s) Oral every 4 hours  sodium chloride 0.9%. 1000 milliLiter(s) (50 mL/Hr) IV Continuous <Continuous>  thiamine 100 milliGRAM(s) Oral daily    MEDICATIONS  (PRN):  acetaminophen     Tablet .. 650 milliGRAM(s) Oral every 6 hours PRN Mild Pain (1 - 3)  diazepam  Injectable 10 milliGRAM(s) IV Push every 1 hour PRN CIWA 8-14  diazepam  Injectable 20 milliGRAM(s) IV Push every 1 hour PRN CIWA 15 or greater, or seizure  heparin   Injectable 9000 Unit(s) IV Push every 6 hours PRN For aPTT less than 40  heparin   Injectable 4000 Unit(s) IV Push every 6 hours PRN For aPTT between 40 - 57  ondansetron Injectable 4 milliGRAM(s) IV Push every 6 hours PRN Nausea and/or Vomiting      No Known Allergies      LABS                        17.1   10.90 )-----------( 94       ( 09 Jun 2025 09:29 )             47.2     06-09    128[L]  |  94[L]  |  10  ----------------------------<  104[H]  3.3[L]   |  27  |  1.10    Ca    8.8      09 Jun 2025 09:29  Mg     2.2     06-08    TPro  7.0  /  Alb  3.3  /  TBili  1.7[H]  /  DBili  x   /  AST  63[H]  /  ALT  69  /  AlkPhos  51  06-09    PT/INR - ( 08 Jun 2025 10:13 )   PT: 11.5 sec;   INR: 0.97 ratio         PTT - ( 09 Jun 2025 09:29 )  PTT:72.2 sec  Urinalysis Basic - ( 09 Jun 2025 09:29 )    Color: x / Appearance: x / SG: x / pH: x  Gluc: 104 mg/dL / Ketone: x  / Bili: x / Urobili: x   Blood: x / Protein: x / Nitrite: x   Leuk Esterase: x / RBC: x / WBC x   Sq Epi: x / Non Sq Epi: x / Bacteria: x            RADIOLOGY & ADDITIONAL TESTS: Reviewed

## 2025-06-09 NOTE — CONSULT NOTE ADULT - ASSESSMENT
44 yo male with a hx unspecified cardiomyopathy, hypertension, alcohol use disorder presenting with substernal chest pain. Admitted for alcohol withdrawal with course c/b new Afib with RVR    Chest pain   possibly secondary to Afib with RVR but given hx tobacco use, family hx CAD and recent exertional dyspnea with LVEF mildly reduced, recommend cardiac cath when improved from withdrawal standpoint. Hold off on initiating DOAC     New Afib with RVR   likely due to alcohol withdrawal. TSh normal   rate control at this time - on home dose metoprolol 150mg BID   maintain K>4, Mg>2  on heparin gtt for now     Cardiomyopathy  reports hx- unspecified etiology with unknown baseline LVEF. Now 45-50%  appears compensated   will obtain cath to r/o ischemia as etiology   favor ACE/ARB post cath over home CCB/HCTZ for reduced LV function/HTN    HTN   as above - controlled at this time     outpatient cardiologist: none. will need to establish with Daisy on DC   discussed plan of care with hospitalist, Dr. Olivier   will cont to follow

## 2025-06-10 LAB
ANION GAP SERPL CALC-SCNC: 5 MMOL/L — SIGNIFICANT CHANGE UP (ref 5–17)
APTT BLD: 44.7 SEC — HIGH (ref 26.1–36.8)
APTT BLD: 47.9 SEC — HIGH (ref 26.1–36.8)
APTT BLD: 62.5 SEC — HIGH (ref 26.1–36.8)
BUN SERPL-MCNC: 17 MG/DL — SIGNIFICANT CHANGE UP (ref 7–23)
CALCIUM SERPL-MCNC: 8.6 MG/DL — SIGNIFICANT CHANGE UP (ref 8.5–10.1)
CHLORIDE SERPL-SCNC: 100 MMOL/L — SIGNIFICANT CHANGE UP (ref 96–108)
CO2 SERPL-SCNC: 29 MMOL/L — SIGNIFICANT CHANGE UP (ref 22–31)
CREAT SERPL-MCNC: 1.33 MG/DL — HIGH (ref 0.5–1.3)
EGFR: 67 ML/MIN/1.73M2 — SIGNIFICANT CHANGE UP
EGFR: 67 ML/MIN/1.73M2 — SIGNIFICANT CHANGE UP
GLUCOSE SERPL-MCNC: 101 MG/DL — HIGH (ref 70–99)
HCT VFR BLD CALC: 43.3 % — SIGNIFICANT CHANGE UP (ref 39–50)
HEPARIN-PF4 AB RESULT: <0.6 U/ML — SIGNIFICANT CHANGE UP (ref 0–0.9)
HGB BLD-MCNC: 15.3 G/DL — SIGNIFICANT CHANGE UP (ref 13–17)
IMMATURE PLATELET FRACTION #: 3 K/UL — LOW (ref 3.9–12.5)
IMMATURE PLATELET FRACTION %: 3.5 % — SIGNIFICANT CHANGE UP (ref 1.6–7.1)
MCHC RBC-ENTMCNC: 33.4 PG — SIGNIFICANT CHANGE UP (ref 27–34)
MCHC RBC-ENTMCNC: 35.3 G/DL — SIGNIFICANT CHANGE UP (ref 32–36)
MCV RBC AUTO: 94.5 FL — SIGNIFICANT CHANGE UP (ref 80–100)
NRBC # BLD AUTO: 0 K/UL — SIGNIFICANT CHANGE UP (ref 0–0)
NRBC # FLD: 0 K/UL — SIGNIFICANT CHANGE UP (ref 0–0)
NRBC BLD AUTO-RTO: 0 /100 WBCS — SIGNIFICANT CHANGE UP (ref 0–0)
PF4 HEPARIN CMPLX AB SER-ACNC: NEGATIVE — SIGNIFICANT CHANGE UP
PLATELET # BLD AUTO: 87 K/UL — LOW (ref 150–400)
PMV BLD: 10.1 FL — SIGNIFICANT CHANGE UP (ref 7–13)
POTASSIUM SERPL-MCNC: 3.4 MMOL/L — LOW (ref 3.5–5.3)
POTASSIUM SERPL-SCNC: 3.4 MMOL/L — LOW (ref 3.5–5.3)
RBC # BLD: 4.58 M/UL — SIGNIFICANT CHANGE UP (ref 4.2–5.8)
RBC # FLD: 13.2 % — SIGNIFICANT CHANGE UP (ref 10.3–14.5)
SODIUM SERPL-SCNC: 134 MMOL/L — LOW (ref 135–145)
WBC # BLD: 8.96 K/UL — SIGNIFICANT CHANGE UP (ref 3.8–10.5)
WBC # FLD AUTO: 8.96 K/UL — SIGNIFICANT CHANGE UP (ref 3.8–10.5)

## 2025-06-10 PROCEDURE — 99233 SBSQ HOSP IP/OBS HIGH 50: CPT

## 2025-06-10 RX ADMIN — HEPARIN SODIUM 4000 UNIT(S): 1000 INJECTION INTRAVENOUS; SUBCUTANEOUS at 07:29

## 2025-06-10 RX ADMIN — Medication 100 MILLIGRAM(S): at 08:50

## 2025-06-10 RX ADMIN — Medication 15 MILLILITER(S): at 09:01

## 2025-06-10 RX ADMIN — METOPROLOL SUCCINATE 5 MILLIGRAM(S): 50 TABLET, EXTENDED RELEASE ORAL at 21:02

## 2025-06-10 RX ADMIN — HEPARIN SODIUM 1800 UNIT(S)/HR: 1000 INJECTION INTRAVENOUS; SUBCUTANEOUS at 19:13

## 2025-06-10 RX ADMIN — METOPROLOL SUCCINATE 150 MILLIGRAM(S): 50 TABLET, EXTENDED RELEASE ORAL at 10:33

## 2025-06-10 RX ADMIN — DIAZEPAM 10 MILLIGRAM(S): 5 TABLET ORAL at 09:13

## 2025-06-10 RX ADMIN — HEPARIN SODIUM 2000 UNIT(S)/HR: 1000 INJECTION INTRAVENOUS; SUBCUTANEOUS at 22:43

## 2025-06-10 RX ADMIN — DIAZEPAM 10 MILLIGRAM(S): 5 TABLET ORAL at 20:20

## 2025-06-10 RX ADMIN — HEPARIN SODIUM 4000 UNIT(S): 1000 INJECTION INTRAVENOUS; SUBCUTANEOUS at 22:52

## 2025-06-10 RX ADMIN — HEPARIN SODIUM 1800 UNIT(S)/HR: 1000 INJECTION INTRAVENOUS; SUBCUTANEOUS at 07:29

## 2025-06-10 RX ADMIN — DIAZEPAM 10 MILLIGRAM(S): 5 TABLET ORAL at 04:23

## 2025-06-10 RX ADMIN — Medication 1 TABLET(S): at 08:50

## 2025-06-10 RX ADMIN — METOPROLOL SUCCINATE 150 MILLIGRAM(S): 50 TABLET, EXTENDED RELEASE ORAL at 22:53

## 2025-06-10 RX ADMIN — DIAZEPAM 10 MILLIGRAM(S): 5 TABLET ORAL at 06:00

## 2025-06-10 RX ADMIN — METOPROLOL SUCCINATE 5 MILLIGRAM(S): 50 TABLET, EXTENDED RELEASE ORAL at 08:52

## 2025-06-10 RX ADMIN — DIAZEPAM 10 MILLIGRAM(S): 5 TABLET ORAL at 12:58

## 2025-06-10 RX ADMIN — DIAZEPAM 10 MILLIGRAM(S): 5 TABLET ORAL at 16:45

## 2025-06-10 RX ADMIN — HEPARIN SODIUM 1800 UNIT(S)/HR: 1000 INJECTION INTRAVENOUS; SUBCUTANEOUS at 15:10

## 2025-06-10 RX ADMIN — FOLIC ACID 1 MILLIGRAM(S): 1 TABLET ORAL at 08:50

## 2025-06-10 NOTE — PROGRESS NOTE ADULT - SUBJECTIVE AND OBJECTIVE BOX
HOSPITALIST PROGRESS NOTE      HPI - 44 yo M with pmh etoh abuse, HTN, cardiomyopathy, h/o etoh pancreatitis , chronic transaminitis p/w vague pleuritic chest discomfort and presented after he was experiencing withdrawal ssx. Consumed large amounts of etoh during the last 2 weeks (last drink 2 days ago) and at risk for impending w/d - denies h/o DTs. Scored 8 on arrival, s/p valium, now a 2.   Pt found to be in new onset rapid afib with rates in 140s -> s/p 1L NS, rates in 90s. Heparin gtt started.     6/9 - Pt feels fine, no Chest pain or SOB, HR up to 130s at times   6/10: CIWA ~8. HR improved. Cardiology recs. Continue CIWA and librium taper.     Review of Systems: 14 Point review of systems reviewed and reported as negative unless otherwise stated above    VITALS  Vital Signs Last 24 Hrs  T(C): 37 (06-10-25 @ 16:00), Max: 37.1 (06-10-25 @ 01:06)  HR: 94 (06-10-25 @ 16:00) (80 - 138)  BP: 117/81 (06-10-25 @ 16:00) (106/71 - 131/70)  RR: 18 (06-10-25 @ 16:00) (18 - 19)  SpO2: 97% (06-10-25 @ 16:00) (94% - 100%)  Patient On (Oxygen Delivery Method): room air        CAPILLARY BLOOD GLUCOSE          PHYSICAL EXAM  General: NAD, sitting comfortably in bed   HEENT: EOMI, no scleral icterus, dry MM  Respiratory: lungs CTA b/l, no wheezes/crackles, no accessory muscle use  Cardiovascular: Regular rhythm/rate; +S1 +S2  Gastrointestinal: Soft, NTND  Extremities: WWP, no cyanosis, no edema, pulses equal  Neurological: A&Ox3, no gross focal deficits, follows commands  Skin: Normal temperature, warm, dry          No Known Allergies      LABS                               15.3   8.96  )-----------( 87       ( 10 Tello 2025 05:12 )             43.3     06-10    134[L]  |  100  |  17  ----------------------------<  101[H]  3.4[L]   |  29  |  1.33[H]    Ca    8.6      10 Tello 2025 05:12    TPro  7.0  /  Alb  3.3  /  TBili  1.7[H]  /  DBili  x   /  AST  63[H]  /  ALT  69  /  AlkPhos  51  06-09      CAPILLARY BLOOD GLUCOSE                           17.1   10.90 )-----------( 94       ( 09 Jun 2025 09:29 )             47.2     06-09    128[L]  |  94[L]  |  10  ----------------------------<  104[H]  3.3[L]   |  27  |  1.10    Ca    8.8      09 Jun 2025 09:29  Mg     2.2     06-08    TPro  7.0  /  Alb  3.3  /  TBili  1.7[H]  /  DBili  x   /  AST  63[H]  /  ALT  69  /  AlkPhos  51  06-09    PT/INR - ( 08 Jun 2025 10:13 )   PT: 11.5 sec;   INR: 0.97 ratio         PTT - ( 09 Jun 2025 09:29 )  PTT:72.2 sec  Urinalysis Basic - ( 09 Jun 2025 09:29 )    Color: x / Appearance: x / SG: x / pH: x  Gluc: 104 mg/dL / Ketone: x  / Bili: x / Urobili: x   Blood: x / Protein: x / Nitrite: x   Leuk Esterase: x / RBC: x / WBC x   Sq Epi: x / Non Sq Epi: x / Bacteria: x    RADIOLOGY:    < from: US Abdomen Upper Quadrant Right (06.08.25 @ 18:20) >  IMPRESSION:  1. Mild hepatosplenomegaly with diffuse increased echogenicity, likely   due to hepatic steatosis.  2. Gallbladder sludge without evidence of cholecystitis.    < end of copied text >  < from: Xray Chest 1 View- PORTABLE-Urgent (06.08.25 @ 10:48) >  IMPRESSION:    Unremarkable frontal chest x ray    < end of copied text >      EKG:    < from: 12 Lead ECG (06.08.25 @ 09:38) >  Diagnosis Line Atrial fibrillation with rapid ventricular response  Abnormal ECG    < end of copied text >      ECHO:    < from: TTE W or WO Ultrasound Enhancing Agent (06.08.25 @ 13:22) >  CONCLUSIONS:      1. The left ventricular cavity is mildly dilated. Left ventricular systolic function is mildly decreased with an ejection fraction estimated at 45 to 50 %.   2. Normal right ventricular size and function.   3. Left atrium is mildly dilated.    < end of copied text >      RADIOLOGY & ADDITIONAL TESTS: Reviewed

## 2025-06-10 NOTE — PROGRESS NOTE ADULT - ASSESSMENT
44 yo male with a hx unspecified cardiomyopathy, hypertension, alcohol use disorder presenting with substernal chest pain. Admitted for alcohol withdrawal with course c/b new Afib with RVR    Chest pain   possibly secondary to Afib with RVR but given hx tobacco use, family hx CAD and recent exertional dyspnea with LVEF mildly reduced, recommend cardiac cath when improved from withdrawal standpoint. Hold off on initiating DOAC    New Afib with RVR   likely due to alcohol withdrawal. TSh normal   rate control at this time - on home dose metoprolol 150mg BID   maintain K>4, Mg>2  on heparin gtt for now     Cardiomyopathy  reports hx- unspecified etiology with unknown baseline LVEF. Now 45-50%  appears compensated   will obtain cath to r/o ischemia as etiology   favor ACE/ARB post cath over home CCB/HCTZ for reduced LV function/HTN    HTN   as above - controlled at this time     outpatient cardiologist: none. will need to establish with Daisy on DC   discussed plan of care with hospitalist, Dr. Olivier   will cont to follow   44 yo male with a hx unspecified cardiomyopathy, hypertension, alcohol use disorder presenting with substernal chest pain. Admitted for alcohol withdrawal with course c/b new Afib with RVR    Chest pain   possibly secondary to Afib with RVR but given hx tobacco use, family hx CAD and recent exertional dyspnea with LVEF mildly reduced, recommend cardiac cath when improved from withdrawal standpoint. Hold off on initiating DOAC    New Afib with RVR   likely due to alcohol withdrawal. TSH normal   rate control at this time - on home dose metoprolol 150mg BID   Afib with RVR this am. Will give IV metoprolol 5mg x 1 now.  S/W RN  maintain K>4, Mg>2  on heparin gtt for now       Cardiomyopathy  reports hx- unspecified etiology with unknown baseline LVEF. Now 45-50%  appears compensated   will obtain cath to r/o ischemia as etiology once HR better controlled  favor ACE/ARB post cath over home CCB/HCTZ for reduced LV function/HTN    HTN   as above - controlled at this time     outpatient cardiologist: none. will need to establish with Daisy on DC      46 yo male with a hx unspecified cardiomyopathy, hypertension, alcohol use disorder presenting with substernal chest pain. Admitted for alcohol withdrawal with course c/b new Afib with RVR    Chest pain   possibly secondary to Afib with RVR but given hx tobacco use, family hx CAD and recent exertional dyspnea with LVEF mildly reduced, recommend cardiac cath when improved from withdrawal standpoint. Hold off on initiating DOAC    New Afib with RVR   likely due to alcohol withdrawal. TSH normal   rate control at this time - on home dose metoprolol 150mg BID   Afib with RVR this am. Will give IV metoprolol 5mg x 1 now.  S/W RN  maintain K>4, Mg>2  on heparin gtt for now     Cardiomyopathy  reports hx- unspecified etiology with unknown baseline LVEF. Now 45-50%  appears compensated   will obtain cath to r/o ischemia as etiology once HR better controlled  favor ACE/ARB post cath over home CCB/HCTZ for reduced LV function/HTN    HTN   as above - controlled at this time     outpatient cardiologist: none. will need to establish with Daisy on DC

## 2025-06-10 NOTE — PROGRESS NOTE ADULT - SUBJECTIVE AND OBJECTIVE BOX
CHIEF COMPLAINT: chest pain       HPI:  46 yo male with a hx unspecified cardiomyopathy, hypertension, alcohol use disorder presenting with substernal chest pain. Pt reports feeling like he was experiencing alcohol withdrawal sx after 1-2 weeks of binge drinking (at least one bottle of wine daily). He has been hospitalized for withdrawal before but denies hx seizures. c/o  intermittent palpitations for several months to years and recent exertional dyspnea.     [facility] has [facility]. reports remote hx catheterization - does not recall being told he had coronary disease.   Has not followed with cardiologists recently.      Family hx notable for father with CAD s/p CABG age 50s   personal hx tobacco use, recent intermittent vaping  occasional cannabis use. +daily alcohol at least one bottle wine daily     Noted to have Afib with RVR for which cardiology was consulted.     6/10/25: Pt denies cp or sob.  Feels jittery.  Tele: Afib with 's-150's      PAST MEDICAL/SURGICAL/FAMILY/SOCIAL HISTORY:    Past Medical, Past Surgical, and Family History:  PAST MEDICAL HISTORY:  Asthma childhood - pt states does not have aymore    HTN (hypertension)     Pancreatitis due to common bile duct stone January 2017    Pneumonia 1 month ago.     PAST SURGICAL HISTORY:  S/P right knee arthroscopy 2 in 2003  1 in 2004.     FAMILY HISTORY:  No pertinent family history in first degree relatives.     Tobacco Usage:  · Tobacco Usage	Former smoker     (08 Jun 2025 12:05)      PAST MEDICAL / SURGICAL HISTORY:  PAST MEDICAL & SURGICAL HISTORY:  Pneumonia  1 month ago      Asthma  childhood - pt states does not have aymore      Pancreatitis due to common bile duct stone  January 2017      HTN (hypertension)      S/P right knee arthroscopy  2 in 2003  1 in 2004          SOCIAL HISTORY:   Alcohol: Denied  Smoking: Nonsmoker  Drug Use: Denied  Marital Status:     FAMILY HISTORY: FAMILY HISTORY:  No pertinent family history in first degree relatives    Home Medications:  amLODIPine 10 mg oral tablet: 1 tab(s) orally once a day (08 Jun 2025 10:48)  hydroCHLOROthiazide 25 mg oral tablet: 1 tab(s) orally once a day (08 Jun 2025 10:48)  Metoprolol Tartrate 100 mg oral tablet: 1.5 tab(s) orally 2 times a day (08 Jun 2025 12:36)    MEDICATIONS  (STANDING):  diazepam  Injectable   IV Push   diazepam  Injectable 10 milliGRAM(s) IV Push every 8 hours  folic acid 1 milliGRAM(s) Oral daily  heparin  Infusion. 1600 Unit(s)/Hr (16 mL/Hr) IV Continuous <Continuous>  metoprolol tartrate 150 milliGRAM(s) Oral two times a day  multivitamin 1 Tablet(s) Oral daily  multivitamin/minerals/iron Oral Solution (CENTRUM) 15 milliLiter(s) Oral daily  sodium chloride 0.9%. 1000 milliLiter(s) (50 mL/Hr) IV Continuous <Continuous>  thiamine 100 milliGRAM(s) Oral daily    MEDICATIONS  (PRN):  acetaminophen     Tablet .. 650 milliGRAM(s) Oral every 6 hours PRN Mild Pain (1 - 3)  diazepam  Injectable 10 milliGRAM(s) IV Push every 1 hour PRN CIWA 8-14  diazepam  Injectable 20 milliGRAM(s) IV Push every 1 hour PRN CIWA 15 or greater, or seizure  heparin   Injectable 9000 Unit(s) IV Push every 6 hours PRN For aPTT less than 40  heparin   Injectable 4000 Unit(s) IV Push every 6 hours PRN For aPTT between 40 - 57  metoprolol tartrate Injectable 5 milliGRAM(s) IV Push every 6 hours PRN HR sustained >120  ondansetron Injectable 4 milliGRAM(s) IV Push every 6 hours PRN Nausea and/or Vomiting    Allergies    No Known Allergies    Intolerances      Vital Signs Last 24 Hrs  T(C): 36.3 (10 Tello 2025 06:38), Max: 37.1 (10 Tello 2025 01:06)  T(F): 97.3 (10 Tello 2025 06:38), Max: 98.8 (10 Tello 2025 01:06)  HR: 95 (10 Tello 2025 06:38) (61 - 138)  BP: 124/76 (10 Tello 2025 06:38) (106/71 - 131/70)  BP(mean): 90 (09 Jun 2025 21:30) (89 - 90)  RR: 19 (10 Tello 2025 06:38) (18 - 19)  SpO2: 96% (10 Tello 2025 06:38) (94% - 99%)    Parameters below as of 10 Tello 2025 06:38  Patient On (Oxygen Delivery Method): room air          I&O's Summary      PHYSICAL EXAM:  Constitutional: NAD, awake and alert  HEENT:  EOMI,  Pupils round, No oral cyanosis.  Pulmonary: Non-labored, breath sounds are clear bilaterally, No wheezing, rales or rhonchi  Cardiovascular:irregularly irregular, tachycardic  Gastrointestinal: Bowel Sounds present, soft, nontender.   Lymph: No peripheral edema.  Neurological: Alert, no focal deficits  Skin: No rashes.  Psych:  Mood & affect appropriate    LABS:                          15.3   8.96  )-----------( 87       ( 10 Tello 2025 05:12 )             43.3                           17.1   10.90 )-----------( 94       ( 09 Jun 2025 09:29 )             47.2                         17.4   13.94 )-----------( 124      ( 08 Jun 2025 18:31 )             47.9                         17.7   11.16 )-----------( 147      ( 08 Jun 2025 10:13 )             46.9     06-10    134[L]  |  100  |  17  ----------------------------<  101[H]  3.4[L]   |  29  |  1.33[H]    Ca    8.6      10 Tello 2025 05:12  Mg     2.2     06-08    TPro  7.0  /  Alb  3.3  /  TBili  1.7[H]  /  DBili  x   /  AST  63[H]  /  ALT  69  /  AlkPhos  51  06-09      09 Jun 2025 09:29    128    |  94     |  10     ----------------------------<  104    3.3     |  27     |  1.10   08 Jun 2025 18:31    126    |  89     |  12     ----------------------------<  118    3.5     |  31     |  1.21   08 Jun 2025 10:13    124    |  85     |  16     ----------------------------<  149    3.1     |  30     |  1.36     Ca    8.8        09 Jun 2025 09:29  Ca    8.5        08 Jun 2025 18:31  Ca    8.3        08 Jun 2025 10:13  Mg     2.2       08 Jun 2025 10:13    TPro  7.0    /  Alb  3.3    /  TBili  1.7    /  DBili  x      /  AST  63     /  ALT  69     /  AlkPhos  51     09 Jun 2025 09:29  TPro  6.6    /  Alb  3.3    /  TBili  2.4    /  DBili  x      /  AST  119    /  ALT  105    /  AlkPhos  78     08 Jun 2025 10:13    PT/INR - ( 08 Jun 2025 10:13 )   PT: 11.5 sec;   INR: 0.97 ratio         PTT - ( 09 Jun 2025 09:29 )  PTT:72.2 sec        06-09 @ 09:29  TSH: 1.19    < from: TTE W or WO Ultrasound Enhancing Agent (06.08.25 @ 13:22) >  CONCLUSIONS:      1. The left ventricular cavity is mildly dilated. Left ventricular systolic function is mildly decreased with an ejection fraction estimated at 45 to 50 %.   2. Normal right ventricular size and function.   3. Left atrium is mildly dilated.    < end of copied text >     CHIEF COMPLAINT: chest pain       HPI:  46 yo male with a hx unspecified cardiomyopathy, hypertension, alcohol use disorder presenting with substernal chest pain. Pt reports feeling like he was experiencing alcohol withdrawal sx after 1-2 weeks of binge drinking (at least one bottle of wine daily). He has been hospitalized for withdrawal before but denies hx seizures. c/o  intermittent palpitations for several months to years and recent exertional dyspnea.     [facility]. reports remote hx catheterization - does not recall being told he had coronary disease.   Has not followed with cardiologists recently.      Family hx notable for father with CAD s/p CABG age 50s   personal hx tobacco use, recent intermittent vaping  occasional cannabis use. +daily alcohol at least one bottle wine daily     Noted to have Afib with RVR for which cardiology was consulted.     6/10/25: Pt denies cp or sob.  Feels jittery.  Medicated with diazepam for anxiety this am.  Tele: Afib with 's-150's sustained      PAST MEDICAL/SURGICAL/FAMILY/SOCIAL HISTORY:    Past Medical, Past Surgical, and Family History:  PAST MEDICAL HISTORY:  Asthma childhood - pt states does not have aymore    HTN (hypertension)     Pancreatitis due to common bile duct stone January 2017    Pneumonia 1 month ago.     PAST SURGICAL HISTORY:  S/P right knee arthroscopy 2 in 2003  1 in 2004.     FAMILY HISTORY:  No pertinent family history in first degree relatives.     Tobacco Usage:  · Tobacco Usage	Former smoker     (08 Jun 2025 12:05)      PAST MEDICAL / SURGICAL HISTORY:  PAST MEDICAL & SURGICAL HISTORY:  Pneumonia  1 month ago      Asthma  childhood - pt states does not have aymore      Pancreatitis due to common bile duct stone  January 2017      HTN (hypertension)      S/P right knee arthroscopy  2 in 2003  1 in 2004          SOCIAL HISTORY:   Alcohol: Denied  Smoking: Nonsmoker  Drug Use: Denied  Marital Status:     FAMILY HISTORY: FAMILY HISTORY:  No pertinent family history in first degree relatives    Home Medications:  amLODIPine 10 mg oral tablet: 1 tab(s) orally once a day (08 Jun 2025 10:48)  hydroCHLOROthiazide 25 mg oral tablet: 1 tab(s) orally once a day (08 Jun 2025 10:48)  Metoprolol Tartrate 100 mg oral tablet: 1.5 tab(s) orally 2 times a day (08 Jun 2025 12:36)    MEDICATIONS  (STANDING):  diazepam  Injectable   IV Push   diazepam  Injectable 10 milliGRAM(s) IV Push every 8 hours  folic acid 1 milliGRAM(s) Oral daily  heparin  Infusion. 1600 Unit(s)/Hr (16 mL/Hr) IV Continuous <Continuous>  metoprolol tartrate 150 milliGRAM(s) Oral two times a day  multivitamin 1 Tablet(s) Oral daily  multivitamin/minerals/iron Oral Solution (CENTRUM) 15 milliLiter(s) Oral daily  sodium chloride 0.9%. 1000 milliLiter(s) (50 mL/Hr) IV Continuous <Continuous>  thiamine 100 milliGRAM(s) Oral daily    MEDICATIONS  (PRN):  acetaminophen     Tablet .. 650 milliGRAM(s) Oral every 6 hours PRN Mild Pain (1 - 3)  diazepam  Injectable 10 milliGRAM(s) IV Push every 1 hour PRN CIWA 8-14  diazepam  Injectable 20 milliGRAM(s) IV Push every 1 hour PRN CIWA 15 or greater, or seizure  heparin   Injectable 9000 Unit(s) IV Push every 6 hours PRN For aPTT less than 40  heparin   Injectable 4000 Unit(s) IV Push every 6 hours PRN For aPTT between 40 - 57  metoprolol tartrate Injectable 5 milliGRAM(s) IV Push every 6 hours PRN HR sustained >120  ondansetron Injectable 4 milliGRAM(s) IV Push every 6 hours PRN Nausea and/or Vomiting    Allergies    No Known Allergies    Intolerances      Vital Signs Last 24 Hrs  T(C): 36.3 (10 Tello 2025 06:38), Max: 37.1 (10 Tello 2025 01:06)  T(F): 97.3 (10 Tello 2025 06:38), Max: 98.8 (10 Tello 2025 01:06)  HR: 95 (10 Tello 2025 06:38) (61 - 138)  BP: 124/76 (10 Tello 2025 06:38) (106/71 - 131/70)  BP(mean): 90 (09 Jun 2025 21:30) (89 - 90)  RR: 19 (10 Tello 2025 06:38) (18 - 19)  SpO2: 96% (10 Tello 2025 06:38) (94% - 99%)    Parameters below as of 10 Tello 2025 06:38  Patient On (Oxygen Delivery Method): room air          I&O's Summary      PHYSICAL EXAM:  Constitutional: NAD, awake and alert  HEENT:  EOMI,  Pupils round, No oral cyanosis.  Pulmonary: Non-labored, breath sounds are clear bilaterally, No wheezing, rales or rhonchi  Cardiovascular:irregularly irregular, tachycardic  Gastrointestinal: Bowel Sounds present, soft, nontender.   Lymph: No peripheral edema.  Neurological: Alert, no focal deficits  Skin: No rashes.  Psych:  Mood & affect appropriate    LABS:                          15.3   8.96  )-----------( 87       ( 10 Tello 2025 05:12 )             43.3                           17.1   10.90 )-----------( 94       ( 09 Jun 2025 09:29 )             47.2                         17.4   13.94 )-----------( 124      ( 08 Jun 2025 18:31 )             47.9                         17.7   11.16 )-----------( 147      ( 08 Jun 2025 10:13 )             46.9     06-10    134[L]  |  100  |  17  ----------------------------<  101[H]  3.4[L]   |  29  |  1.33[H]    Ca    8.6      10 Tello 2025 05:12  Mg     2.2     06-08    TPro  7.0  /  Alb  3.3  /  TBili  1.7[H]  /  DBili  x   /  AST  63[H]  /  ALT  69  /  AlkPhos  51  06-09      09 Jun 2025 09:29    128    |  94     |  10     ----------------------------<  104    3.3     |  27     |  1.10   08 Jun 2025 18:31    126    |  89     |  12     ----------------------------<  118    3.5     |  31     |  1.21   08 Jun 2025 10:13    124    |  85     |  16     ----------------------------<  149    3.1     |  30     |  1.36     Ca    8.8        09 Jun 2025 09:29  Ca    8.5        08 Jun 2025 18:31  Ca    8.3        08 Jun 2025 10:13  Mg     2.2       08 Jun 2025 10:13    TPro  7.0    /  Alb  3.3    /  TBili  1.7    /  DBili  x      /  AST  63     /  ALT  69     /  AlkPhos  51     09 Jun 2025 09:29  TPro  6.6    /  Alb  3.3    /  TBili  2.4    /  DBili  x      /  AST  119    /  ALT  105    /  AlkPhos  78     08 Jun 2025 10:13    PT/INR - ( 08 Jun 2025 10:13 )   PT: 11.5 sec;   INR: 0.97 ratio         PTT - ( 09 Jun 2025 09:29 )  PTT:72.2 sec        06-09 @ 09:29  TSH: 1.19    < from: TTE W or WO Ultrasound Enhancing Agent (06.08.25 @ 13:22) >  CONCLUSIONS:      1. The left ventricular cavity is mildly dilated. Left ventricular systolic function is mildly decreased with an ejection fraction estimated at 45 to 50 %.   2. Normal right ventricular size and function.   3. Left atrium is mildly dilated.    < end of copied text >

## 2025-06-10 NOTE — PROGRESS NOTE ADULT - ASSESSMENT
"45 year old male with PMHx of cardiomyopathy, HTN; presents to ED c/o chest pain. Patient states he thinks he is experiencing EtOH withdrawal symptoms.  Patient states he was drinking EtOH for about 11 days and recently stopped about 2 days ago. Last drink, friday evening. Patient states he has done this in the past and feels like his withdrawal symptoms worsen over the years.  Denies history of withdrawal seizures. Former smoker. Does not have cardiologist."    44 yo M with pmh etoh abuse, HTN, cardiomyopathy, h/o etoh pancreatitis , chronic transaminitis p/w vague pleuritic chest discomfort and presented after he was experiencing withdrawal ssx. Consumed large amounts of etoh during the last 2 weeks (last drink 2 days ago) and at risk for impending w/d - denies h/o DTs. Scored 8 on arrival, s/p valium, now a 2.   Pt found to be in new onset rapid afib with rates in 140s -> s/p 1L NS, rates in 90s. Heparin gtt started.     Serum Na: 124  Serum K: 3.1  Scr: 1.4  Vitals stable  Denies neurological deficits.   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%)  #r/o CAD  - tele & EKG w a fib  - c/w metoprolol 150mg BID (home med)  - IVF  - monitor lytes  - CHADS VASC: 2 (HTN, cardiomyopathy) > heparin drip for now, possible cath  - cardio consult appreciated  - monitor lytes      #Hyponatremia/Hypokalemia  - suspect 2/2 alcohol use, poor PO intake   - goal Na correction 6-8 meq in 24 hours  - Na 128 this AM   - replete PRN       #NOHEMI  - suspect pre-renal  - 1.3 now 1.1 s/p IVF      #HTN with cardiomyopathy (EF unknown)  - TTE  - c/w metoprolol  - cardio consult, possible cath tomorrow         #chronic transaminitis seen on outside records on HIE dating back to 2019, no labs since then  - prior MRI in 2019 identified hepatic steatosis  - obtain RUQ sono  - GI consult as o/p    #thrombocytopenia  - Last labs outpatient 2017 show flucuating platelets from normal to low 130-140  - 147 on admission now 93 since initiating heparin (<50% drop)  - HIT score 3 points : low probability of HIT  - suspect thrombytopenia is chronic to some extent given alcohol history   - no sign/sx of clots currently   - will send Heparin AB/reflex       #ETOH withdrawal  #etoh abuse  - continue ciwa scale/diazepam taper  - mvi, thiamine, folate    #dvt ppx - heparin    #dispo - rate control, possible cath in AM , f/u platelets for r/o HIT

## 2025-06-11 DIAGNOSIS — F19.94 OTHER PSYCHOACTIVE SUBSTANCE USE, UNSPECIFIED WITH PSYCHOACTIVE SUBSTANCE-INDUCED MOOD DISORDER: ICD-10-CM

## 2025-06-11 LAB
ALBUMIN SERPL ELPH-MCNC: 2.8 G/DL — LOW (ref 3.3–5)
ALP SERPL-CCNC: 49 U/L — SIGNIFICANT CHANGE UP (ref 40–120)
ALT FLD-CCNC: 43 U/L — SIGNIFICANT CHANGE UP (ref 12–78)
ANION GAP SERPL CALC-SCNC: 3 MMOL/L — LOW (ref 5–17)
APTT BLD: 63.9 SEC — HIGH (ref 26.1–36.8)
APTT BLD: 68 SEC — HIGH (ref 26.1–36.8)
AST SERPL-CCNC: 32 U/L — SIGNIFICANT CHANGE UP (ref 15–37)
BILIRUB SERPL-MCNC: 0.8 MG/DL — SIGNIFICANT CHANGE UP (ref 0.2–1.2)
BUN SERPL-MCNC: 17 MG/DL — SIGNIFICANT CHANGE UP (ref 7–23)
CALCIUM SERPL-MCNC: 8.5 MG/DL — SIGNIFICANT CHANGE UP (ref 8.5–10.1)
CHLORIDE SERPL-SCNC: 102 MMOL/L — SIGNIFICANT CHANGE UP (ref 96–108)
CO2 SERPL-SCNC: 30 MMOL/L — SIGNIFICANT CHANGE UP (ref 22–31)
CREAT SERPL-MCNC: 1.18 MG/DL — SIGNIFICANT CHANGE UP (ref 0.5–1.3)
EGFR: 77 ML/MIN/1.73M2 — SIGNIFICANT CHANGE UP
EGFR: 77 ML/MIN/1.73M2 — SIGNIFICANT CHANGE UP
GLUCOSE SERPL-MCNC: 98 MG/DL — SIGNIFICANT CHANGE UP (ref 70–99)
HCT VFR BLD CALC: 40.5 % — SIGNIFICANT CHANGE UP (ref 39–50)
HGB BLD-MCNC: 13.9 G/DL — SIGNIFICANT CHANGE UP (ref 13–17)
IMMATURE PLATELET FRACTION #: 3 K/UL — LOW (ref 3.9–12.5)
IMMATURE PLATELET FRACTION %: 3.6 % — SIGNIFICANT CHANGE UP (ref 1.6–7.1)
MAGNESIUM SERPL-MCNC: 2.7 MG/DL — HIGH (ref 1.6–2.6)
MCHC RBC-ENTMCNC: 33.5 PG — SIGNIFICANT CHANGE UP (ref 27–34)
MCHC RBC-ENTMCNC: 34.3 G/DL — SIGNIFICANT CHANGE UP (ref 32–36)
MCV RBC AUTO: 97.6 FL — SIGNIFICANT CHANGE UP (ref 80–100)
NRBC # BLD AUTO: 0 K/UL — SIGNIFICANT CHANGE UP (ref 0–0)
NRBC # FLD: 0 K/UL — SIGNIFICANT CHANGE UP (ref 0–0)
NRBC BLD AUTO-RTO: 0 /100 WBCS — SIGNIFICANT CHANGE UP (ref 0–0)
PHOSPHATE SERPL-MCNC: 2.9 MG/DL — SIGNIFICANT CHANGE UP (ref 2.5–4.5)
PLATELET # BLD AUTO: 83 K/UL — LOW (ref 150–400)
PMV BLD: 10.4 FL — SIGNIFICANT CHANGE UP (ref 7–13)
POTASSIUM SERPL-MCNC: 3.5 MMOL/L — SIGNIFICANT CHANGE UP (ref 3.5–5.3)
POTASSIUM SERPL-SCNC: 3.5 MMOL/L — SIGNIFICANT CHANGE UP (ref 3.5–5.3)
PROT SERPL-MCNC: 6.6 GM/DL — SIGNIFICANT CHANGE UP (ref 6–8.3)
RBC # BLD: 4.15 M/UL — LOW (ref 4.2–5.8)
RBC # FLD: 13.6 % — SIGNIFICANT CHANGE UP (ref 10.3–14.5)
SODIUM SERPL-SCNC: 135 MMOL/L — SIGNIFICANT CHANGE UP (ref 135–145)
WBC # BLD: 5.86 K/UL — SIGNIFICANT CHANGE UP (ref 3.8–10.5)
WBC # FLD AUTO: 5.86 K/UL — SIGNIFICANT CHANGE UP (ref 3.8–10.5)

## 2025-06-11 PROCEDURE — 99233 SBSQ HOSP IP/OBS HIGH 50: CPT

## 2025-06-11 PROCEDURE — 90792 PSYCH DIAG EVAL W/MED SRVCS: CPT

## 2025-06-11 RX ORDER — GABAPENTIN 400 MG/1
100 CAPSULE ORAL THREE TIMES A DAY
Refills: 0 | Status: DISCONTINUED | OUTPATIENT
Start: 2025-06-11 | End: 2025-06-12

## 2025-06-11 RX ORDER — HYDROXYZINE HYDROCHLORIDE 25 MG/1
25 TABLET, FILM COATED ORAL EVERY 6 HOURS
Refills: 0 | Status: DISCONTINUED | OUTPATIENT
Start: 2025-06-11 | End: 2025-06-13

## 2025-06-11 RX ADMIN — DIAZEPAM 10 MILLIGRAM(S): 5 TABLET ORAL at 17:47

## 2025-06-11 RX ADMIN — HYDROXYZINE HYDROCHLORIDE 25 MILLIGRAM(S): 25 TABLET, FILM COATED ORAL at 11:30

## 2025-06-11 RX ADMIN — FOLIC ACID 1 MILLIGRAM(S): 1 TABLET ORAL at 10:00

## 2025-06-11 RX ADMIN — Medication 1 TABLET(S): at 10:00

## 2025-06-11 RX ADMIN — HEPARIN SODIUM 2000 UNIT(S)/HR: 1000 INJECTION INTRAVENOUS; SUBCUTANEOUS at 17:48

## 2025-06-11 RX ADMIN — METOPROLOL SUCCINATE 150 MILLIGRAM(S): 50 TABLET, EXTENDED RELEASE ORAL at 10:02

## 2025-06-11 RX ADMIN — GABAPENTIN 100 MILLIGRAM(S): 400 CAPSULE ORAL at 14:25

## 2025-06-11 RX ADMIN — Medication 100 MILLIGRAM(S): at 10:00

## 2025-06-11 RX ADMIN — Medication 15 MILLILITER(S): at 10:00

## 2025-06-11 RX ADMIN — METOPROLOL SUCCINATE 150 MILLIGRAM(S): 50 TABLET, EXTENDED RELEASE ORAL at 21:44

## 2025-06-11 RX ADMIN — Medication 650 MILLIGRAM(S): at 07:25

## 2025-06-11 RX ADMIN — HEPARIN SODIUM 2000 UNIT(S)/HR: 1000 INJECTION INTRAVENOUS; SUBCUTANEOUS at 19:31

## 2025-06-11 RX ADMIN — HEPARIN SODIUM 2000 UNIT(S)/HR: 1000 INJECTION INTRAVENOUS; SUBCUTANEOUS at 07:23

## 2025-06-11 RX ADMIN — DIAZEPAM 10 MILLIGRAM(S): 5 TABLET ORAL at 05:34

## 2025-06-11 RX ADMIN — GABAPENTIN 100 MILLIGRAM(S): 400 CAPSULE ORAL at 21:44

## 2025-06-11 RX ADMIN — HEPARIN SODIUM 2000 UNIT(S)/HR: 1000 INJECTION INTRAVENOUS; SUBCUTANEOUS at 17:29

## 2025-06-11 NOTE — PROGRESS NOTE ADULT - ASSESSMENT
44 yo male with a hx unspecified cardiomyopathy, hypertension, alcohol use disorder presenting with substernal chest pain. Admitted for alcohol withdrawal with course c/b new Afib with RVR    Chest pain   Currently CP free, troponin negative, EKG no Ischemic ST changes, Echo with diminished LV FX of unclear chronicity, CP possibly secondary to Afib with RVR but given hx tobacco use, family hx CAD recent exertional dyspnea with depressed LV FX recommend cardiac cath when improved from withdrawal standpoint. Hold off on initiating DOAC CW Heparin GTT    New Afib with RVR   likely due to alcohol withdrawal. Depressed LV FX, LA mildly dilated, TSH normal, OPT sleep study   HR improved  CW BB/IV Heparin  maintain K>4, Mg>2      Cardiomyopathy  Unclear etiology and chronicity--in speaking with patient he does not recall having a cardiac cath but thinks he was told of a cardiomyopathy [facility] who he has not been seeing   appears clinically compensated, , CXR Unremarkable  will obtain cath as above  favor ACE/ARB post cath over home CCB/HCTZ for reduced LV function/HTN    HTN   as above - controlled at this time     outpatient cardiologist: none. will need to establish with Daisy on DC

## 2025-06-11 NOTE — BH CONSULTATION LIAISON ASSESSMENT NOTE - NSBHCHARTREVIEWINVESTIGATE_PSY_A_CORE FT
Ventricular Rate 125 BPM    QRS Duration 110 ms    Q-T Interval 344 ms    QTC Calculation(Bazett) 496 ms    R Axis 53 degrees    T Axis 39 degrees    Diagnosis Line Atrial fibrillation with rapid ventricular response  Abnormal ECG  Confirmed by Dony Ward ([id number]) on 6/9/2025 11:44:02 AM

## 2025-06-11 NOTE — PROGRESS NOTE ADULT - SUBJECTIVE AND OBJECTIVE BOX
HOSPITALIST PROGRESS NOTE      HPI - 46 yo M with pmh etoh abuse, HTN, cardiomyopathy, h/o etoh pancreatitis , chronic transaminitis p/w vague pleuritic chest discomfort and presented after he was experiencing withdrawal ssx. Consumed large amounts of etoh during the last 2 weeks (last drink 2 days ago) and at risk for impending w/d - denies h/o DTs. Scored 8 on arrival, s/p valium, now a 2.   Pt found to be in new onset rapid afib with rates in 140s -> s/p 1L NS, rates in 90s. Heparin gtt started.     6/9 - Pt feels fine, no Chest pain or SOB, HR up to 130s at times   6/10: CIWA ~8. HR improved. Cardiology recs. Continue CIWA and librium taper.   6/11: CIWA improved. HR improved. Psych consult/recs for assistance in anxiety control without increasing benzo use. Ischemic work up in 1-2 days if Withdrawl component remains stable.     Review of Systems: 14 Point review of systems reviewed and reported as negative unless otherwise stated above    VITALS  Vital Signs Last 24 Hrs  T(C): 36.5 (06-11-25 @ 13:30), Max: 37.2 (06-10-25 @ 20:00)  HR: 78 (06-11-25 @ 13:30) (73 - 150)  BP: 113/90 (06-11-25 @ 13:30) (111/78 - 120/86)  RR: 16 (06-11-25 @ 13:30) (16 - 20)  SpO2: 93% (06-11-25 @ 13:30) (93% - 100%)  Patient On (Oxygen Delivery Method): room air        CAPILLARY BLOOD GLUCOSE          PHYSICAL EXAM  General: NAD, sitting comfortably in bed   HEENT: EOMI, no scleral icterus, dry MM  Respiratory: lungs CTA b/l, no wheezes/crackles, no accessory muscle use  Cardiovascular: Regular rhythm/rate; +S1 +S2  Gastrointestinal: Soft, NTND  Extremities: WWP, no cyanosis, no edema, pulses equal  Neurological: A&Ox3, no gross focal deficits, follows commands  Skin: Normal temperature, warm, dry          No Known Allergies      LABS                             13.9   5.86  )-----------( 83       ( 11 Jun 2025 05:34 )             40.5     06-11    135  |  102  |  17  ----------------------------<  98  3.5   |  30  |  1.18    Ca    8.5      11 Jun 2025 05:34  Phos  2.9     06-11  Mg     2.7     06-11    TPro  6.6  /  Alb  2.8[L]  /  TBili  0.8  /  DBili  x   /  AST  32  /  ALT  43  /  AlkPhos  49  06-11      CAPILLARY BLOOD GLUCOSE                                15.3   8.96  )-----------( 87       ( 10 Tello 2025 05:12 )             43.3     06-10    134[L]  |  100  |  17  ----------------------------<  101[H]  3.4[L]   |  29  |  1.33[H]    Ca    8.6      10 Tello 2025 05:12    TPro  7.0  /  Alb  3.3  /  TBili  1.7[H]  /  DBili  x   /  AST  63[H]  /  ALT  69  /  AlkPhos  51  06-09      CAPILLARY BLOOD GLUCOSE                           17.1   10.90 )-----------( 94       ( 09 Jun 2025 09:29 )             47.2     06-09    128[L]  |  94[L]  |  10  ----------------------------<  104[H]  3.3[L]   |  27  |  1.10    Ca    8.8      09 Jun 2025 09:29  Mg     2.2     06-08    TPro  7.0  /  Alb  3.3  /  TBili  1.7[H]  /  DBili  x   /  AST  63[H]  /  ALT  69  /  AlkPhos  51  06-09    PT/INR - ( 08 Jun 2025 10:13 )   PT: 11.5 sec;   INR: 0.97 ratio         PTT - ( 09 Jun 2025 09:29 )  PTT:72.2 sec  Urinalysis Basic - ( 09 Jun 2025 09:29 )    Color: x / Appearance: x / SG: x / pH: x  Gluc: 104 mg/dL / Ketone: x  / Bili: x / Urobili: x   Blood: x / Protein: x / Nitrite: x   Leuk Esterase: x / RBC: x / WBC x   Sq Epi: x / Non Sq Epi: x / Bacteria: x    RADIOLOGY:    < from: US Abdomen Upper Quadrant Right (06.08.25 @ 18:20) >  IMPRESSION:  1. Mild hepatosplenomegaly with diffuse increased echogenicity, likely   due to hepatic steatosis.  2. Gallbladder sludge without evidence of cholecystitis.    < end of copied text >  < from: Xray Chest 1 View- PORTABLE-Urgent (06.08.25 @ 10:48) >  IMPRESSION:    Unremarkable frontal chest x ray    < end of copied text >      EKG:    < from: 12 Lead ECG (06.08.25 @ 09:38) >  Diagnosis Line Atrial fibrillation with rapid ventricular response  Abnormal ECG    < end of copied text >      ECHO:    < from: TTE W or WO Ultrasound Enhancing Agent (06.08.25 @ 13:22) >  CONCLUSIONS:      1. The left ventricular cavity is mildly dilated. Left ventricular systolic function is mildly decreased with an ejection fraction estimated at 45 to 50 %.   2. Normal right ventricular size and function.   3. Left atrium is mildly dilated.    < end of copied text >      RADIOLOGY & ADDITIONAL TESTS: Reviewed

## 2025-06-11 NOTE — BH CONSULTATION LIAISON ASSESSMENT NOTE - CURRENT MEDICATION
MEDICATIONS  (STANDING):  diazepam  Injectable   IV Push   diazepam  Injectable 10 milliGRAM(s) IV Push every 12 hours  folic acid 1 milliGRAM(s) Oral daily  gabapentin 100 milliGRAM(s) Oral three times a day  heparin  Infusion. 1600 Unit(s)/Hr (16 mL/Hr) IV Continuous <Continuous>  metoprolol tartrate 150 milliGRAM(s) Oral two times a day  multivitamin 1 Tablet(s) Oral daily  multivitamin/minerals/iron Oral Solution (CENTRUM) 15 milliLiter(s) Oral daily  sodium chloride 0.9%. 1000 milliLiter(s) (50 mL/Hr) IV Continuous <Continuous>  thiamine 100 milliGRAM(s) Oral daily    MEDICATIONS  (PRN):  acetaminophen     Tablet .. 650 milliGRAM(s) Oral every 6 hours PRN Mild Pain (1 - 3)  diazepam  Injectable 10 milliGRAM(s) IV Push every 1 hour PRN CIWA 8-14  diazepam  Injectable 20 milliGRAM(s) IV Push every 1 hour PRN CIWA 15 or greater, or seizure  heparin   Injectable 9000 Unit(s) IV Push every 6 hours PRN For aPTT less than 40  heparin   Injectable 4000 Unit(s) IV Push every 6 hours PRN For aPTT between 40 - 57  hydrOXYzine hydrochloride 25 milliGRAM(s) Oral every 6 hours PRN Anxiety  metoprolol tartrate Injectable 5 milliGRAM(s) IV Push every 6 hours PRN HR sustained >120  ondansetron Injectable 4 milliGRAM(s) IV Push every 6 hours PRN Nausea and/or Vomiting

## 2025-06-11 NOTE — BH CONSULTATION LIAISON ASSESSMENT NOTE - HPI (INCLUDE ILLNESS QUALITY, SEVERITY, DURATION, TIMING, CONTEXT, MODIFYING FACTORS, ASSOCIATED SIGNS AND SYMPTOMS)
Patient is a 45 years old white  man with remote psychiatric history of depressive episode when his brother  21 years ago who has history of drinking alcohol, mostly binge drinking pattern.  With history of mild withdrawals, no seizures and denies blackouts, presented for detox from alcohol and experiencing anxiety.    Patient states that he had a depressive episode that his blood .  He served in the  as a [employer] for 10 years.  Denies being incompetent.  However he states that he went a lot and that he gets a lot of anxiety.  His alcohol serves him to help with anxiety so he self medicate.  And regularly he can take a borderline and that has tendency to increase in a few days.  He also says he tapered himself off of alcohol as he does not really have history of serious withdrawals in the sense of seizures.    He is drinking for years but most recently has been increasing pattern due to staying home and working from home.  He works in the [employer] and now is required to go to [place] however roads are blocked due to demonstrations and patient staying home.    At that all increase his drinking.    Patient denies current or ever having thoughts about harming himself.  Denies current or ever having delusional thinking.  He reports that that after drinking.  Has a he can hear some noises, nonspecific noises, never hear voices and he also states that he misinterprets radio sounds from its air conditioner i in noises with different meaning and conditioner.    Denies other substance.

## 2025-06-11 NOTE — BH CONSULTATION LIAISON ASSESSMENT NOTE - SUMMARY
45 years old white  man with remote history of depressive episode in light of his brother passing 21 years ago, presents with alcohol abuse to complete detox, verbalizing anxiety.    Patient has no thoughts of harming self or somebody else, he is not at acute suicide risk, he is not psychotic.    Patient does not meet inpatient psychiatry admission.    Plan:    Medication for anxiety discussed.  Agree with using hydroxyzine as needed.    Discussed alternative medication for anxiety to SSRIs patient does not want SSRIs due to sexual side effects.  Mention Seroquel and Neurontin.  After discussing side effects and benefits patient agrees to trial of Neurontin to address anxiety.    Start Neurontin 10 mg 3 times daily standing.    Patient states that he already is scheduled psychiatrist Dr. Cardenas whom he was going to see on Sunday.     to concur with this outpatient psychiatrist that patient does have appointment in he can continue treatment in that office.

## 2025-06-11 NOTE — BH CONSULTATION LIAISON ASSESSMENT NOTE - NSBHCHARTREVIEWLAB_PSY_A_CORE FT
13.9   5.86  )-----------( 83       ( 11 Jun 2025 05:34 )             40.5   06-11    135  |  102  |  17  ----------------------------<  98  3.5   |  30  |  1.18    Ca    8.5      11 Jun 2025 05:34  Phos  2.9     06-11  Mg     2.7     06-11    TPro  6.6  /  Alb  2.8[L]  /  TBili  0.8  /  DBili  x   /  AST  32  /  ALT  43  /  AlkPhos  49  06-11

## 2025-06-11 NOTE — BH CONSULTATION LIAISON ASSESSMENT NOTE - NSBHCONSULTFOLLOWAFTERCARE_PSY_A_CORE FT
Social work taking confirmed appointment for already scheduled psychiatric assessment with Dr. Cardenas on Sunday

## 2025-06-11 NOTE — BH CONSULTATION LIAISON ASSESSMENT NOTE - RISK ASSESSMENT
Low acute risk: Patient does not want to die, he is not depressed, he is not completing his detox and experienced anxiety is responding to medication.    Water risk is increased based on history of depressive episode, substance abuse and anxiety.    Protective factors: Family supportive, patient does not have psychiatric history of suicidality hospitalizations.    Mitigation of risk: Outpatient treatment for alcohol abuse and anxiety.

## 2025-06-11 NOTE — BH CONSULTATION LIAISON ASSESSMENT NOTE - NSBHCHARTREVIEWVS_PSY_A_CORE FT
Vital Signs Last 24 Hrs  T(C): 36.3 (11 Jun 2025 08:22), Max: 37.2 (10 Tello 2025 20:00)  T(F): 97.3 (11 Jun 2025 08:22), Max: 98.9 (10 Tello 2025 20:00)  HR: 86 (11 Jun 2025 08:22) (73 - 150)  BP: 117/85 (11 Jun 2025 08:22) (111/78 - 120/86)  BP(mean): --  RR: 16 (11 Jun 2025 08:22) (16 - 20)  SpO2: 100% (11 Jun 2025 08:22) (94% - 100%)    Parameters below as of 11 Jun 2025 08:22  Patient On (Oxygen Delivery Method): room air

## 2025-06-11 NOTE — PROGRESS NOTE ADULT - SUBJECTIVE AND OBJECTIVE BOX
CHIEF COMPLAINT: chest pain       HPI:  44 yo male with a hx unspecified cardiomyopathy, hypertension, alcohol use disorder presenting with substernal chest pain. Pt reports feeling like he was experiencing alcohol withdrawal sx after 1-2 weeks of binge drinking (at least one bottle of wine daily). He has been hospitalized for withdrawal before but denies hx seizures. c/o  intermittent palpitations for several months to years and recent exertional dyspnea.     [facility]. reports remote hx catheterization - does not recall being told he had coronary disease.   Has not followed with cardiologists recently.      Family hx notable for father with CAD s/p CABG age 50s   personal hx tobacco use, recent intermittent vaping  occasional cannabis use. +daily alcohol at least one bottle wine daily     Noted to have Afib with RVR for which cardiology was consulted.     6/10/25: Pt denies cp or sob.  Feels jittery.  Medicated with diazepam for anxiety this am.  Tele: Afib with 's-150's sustained  6/11/25: Seen today no CP/SOB still with mild tremors" (ETOH/anxiety) Tele AF HR improved over the past 24 HRS (has brief Non sustained rapid rates with activity )      PAST MEDICAL/SURGICAL/FAMILY/SOCIAL HISTORY:    Past Medical, Past Surgical, and Family History:  PAST MEDICAL HISTORY:  Asthma childhood - pt states does not have aymore    HTN (hypertension)     Pancreatitis due to common bile duct stone January 2017    Pneumonia 1 month ago.     PAST SURGICAL HISTORY:  S/P right knee arthroscopy 2 in 2003  1 in 2004.     FAMILY HISTORY:  No pertinent family history in first degree relatives.     Tobacco Usage:  · Tobacco Usage	Former smoker     (08 Jun 2025 12:05)      PAST MEDICAL / SURGICAL HISTORY:  PAST MEDICAL & SURGICAL HISTORY:  Pneumonia  1 month ago      Asthma  childhood - pt states does not have aymore      Pancreatitis due to common bile duct stone  January 2017      HTN (hypertension)      S/P right knee arthroscopy  2 in 2003  1 in 2004          SOCIAL HISTORY:   Alcohol: Denied  Smoking: Nonsmoker  Drug Use: Denied  Marital Status:     FAMILY HISTORY: FAMILY HISTORY:  No pertinent family history in first degree relatives    Home Medications:  amLODIPine 10 mg oral tablet: 1 tab(s) orally once a day (08 Jun 2025 10:48)  hydroCHLOROthiazide 25 mg oral tablet: 1 tab(s) orally once a day (08 Jun 2025 10:48)  Metoprolol Tartrate 100 mg oral tablet: 1.5 tab(s) orally 2 times a day (08 Jun 2025 12:36)    MEDICATIONS  (STANDING):  diazepam  Injectable   IV Push   diazepam  Injectable 10 milliGRAM(s) IV Push every 12 hours  folic acid 1 milliGRAM(s) Oral daily  heparin  Infusion. 1600 Unit(s)/Hr (16 mL/Hr) IV Continuous <Continuous>  metoprolol tartrate 150 milliGRAM(s) Oral two times a day  multivitamin 1 Tablet(s) Oral daily  multivitamin/minerals/iron Oral Solution (CENTRUM) 15 milliLiter(s) Oral daily  sodium chloride 0.9%. 1000 milliLiter(s) (50 mL/Hr) IV Continuous <Continuous>  thiamine 100 milliGRAM(s) Oral daily    MEDICATIONS  (PRN):  acetaminophen     Tablet .. 650 milliGRAM(s) Oral every 6 hours PRN Mild Pain (1 - 3)  diazepam  Injectable 10 milliGRAM(s) IV Push every 1 hour PRN CIWA 8-14  diazepam  Injectable 20 milliGRAM(s) IV Push every 1 hour PRN CIWA 15 or greater, or seizure  heparin   Injectable 9000 Unit(s) IV Push every 6 hours PRN For aPTT less than 40  heparin   Injectable 4000 Unit(s) IV Push every 6 hours PRN For aPTT between 40 - 57  hydrOXYzine hydrochloride 25 milliGRAM(s) Oral every 6 hours PRN Anxiety  metoprolol tartrate Injectable 5 milliGRAM(s) IV Push every 6 hours PRN HR sustained >120  ondansetron Injectable 4 milliGRAM(s) IV Push every 6 hours PRN Nausea and/or Vomiting      Allergies    No Known Allergies    Intolerances      Vital Signs Last 24 Hrs  T(C): 36.3 (11 Jun 2025 08:22), Max: 37.2 (10 Tello 2025 20:00)  T(F): 97.3 (11 Jun 2025 08:22), Max: 98.9 (10 Tello 2025 20:00)  HR: 86 (11 Jun 2025 08:22) (73 - 150)  BP: 117/85 (11 Jun 2025 08:22) (107/80 - 120/86)  BP(mean): --  RR: 16 (11 Jun 2025 08:22) (16 - 20)  SpO2: 100% (11 Jun 2025 08:22) (94% - 100%)    Parameters below as of 11 Jun 2025 08:22  Patient On (Oxygen Delivery Method): room air        PHYSICAL EXAM:  Constitutional: NAD, awake and alert  HEENT:  EOMI,  Pupils round, No oral cyanosis.  Pulmonary: Non-labored, breath sounds are clear bilaterally  Cardiovascular: Irregular  Gastrointestinal: Abd soft, nontender.   Lymph: No peripheral edema.  Neurological: Alert, no focal deficits      LABS:                          15.3   8.96  )-----------( 87       ( 10 Tello 2025 05:12 )             43.3                           17.1   10.90 )-----------( 94       ( 09 Jun 2025 09:29 )             47.2                         17.4   13.94 )-----------( 124      ( 08 Jun 2025 18:31 )             47.9                         17.7   11.16 )-----------( 147      ( 08 Jun 2025 10:13 )             46.9     06-10    134[L]  |  100  |  17  ----------------------------<  101[H]  3.4[L]   |  29  |  1.33[H]    Ca    8.6      10 Tello 2025 05:12  Mg     2.2     06-08    TPro  7.0  /  Alb  3.3  /  TBili  1.7[H]  /  DBili  x   /  AST  63[H]  /  ALT  69  /  AlkPhos  51  06-09      09 Jun 2025 09:29    128    |  94     |  10     ----------------------------<  104    3.3     |  27     |  1.10   08 Jun 2025 18:31    126    |  89     |  12     ----------------------------<  118    3.5     |  31     |  1.21   08 Jun 2025 10:13    124    |  85     |  16     ----------------------------<  149    3.1     |  30     |  1.36     Ca    8.8        09 Jun 2025 09:29  Ca    8.5        08 Jun 2025 18:31  Ca    8.3        08 Jun 2025 10:13  Mg     2.2       08 Jun 2025 10:13    TPro  7.0    /  Alb  3.3    /  TBili  1.7    /  DBili  x      /  AST  63     /  ALT  69     /  AlkPhos  51     09 Jun 2025 09:29  TPro  6.6    /  Alb  3.3    /  TBili  2.4    /  DBili  x      /  AST  119    /  ALT  105    /  AlkPhos  78     08 Jun 2025 10:13    PT/INR - ( 08 Jun 2025 10:13 )   PT: 11.5 sec;   INR: 0.97 ratio         PTT - ( 09 Jun 2025 09:29 )  PTT:72.2 sec        06-09 @ 09:29  TSH: 1.19    < from: TTE W or WO Ultrasound Enhancing Agent (06.08.25 @ 13:22) >  CONCLUSIONS:      1. The left ventricular cavity is mildly dilated. Left ventricular systolic function is mildly decreased with an ejection fraction estimated at 45 to 50 %.   2. Normal right ventricular size and function.   3. Left atrium is mildly dilated.    < end of copied text >

## 2025-06-11 NOTE — BH CONSULTATION LIAISON ASSESSMENT NOTE - PAST PSYCHOTROPIC MEDICATION
Patient reports being on different SSRIs Zoloft including and they will cause sexual side effects so he does not like SSRIs.  He also was on Wellbutrin to stop smoking and did not like the feeling of anxiety.

## 2025-06-11 NOTE — PROGRESS NOTE ADULT - ASSESSMENT
"45 year old male with PMHx of cardiomyopathy, HTN; presents to ED c/o chest pain. Patient states he thinks he is experiencing EtOH withdrawal symptoms.  Patient states he was drinking EtOH for about 11 days and recently stopped about 2 days ago. Last drink, friday evening. Patient states he has done this in the past and feels like his withdrawal symptoms worsen over the years.  Denies history of withdrawal seizures. Former smoker. Does not have cardiologist."    46 yo M with pmh etoh abuse, HTN, cardiomyopathy, h/o etoh pancreatitis , chronic transaminitis p/w vague pleuritic chest discomfort and presented after he was experiencing withdrawal ssx. Consumed large amounts of etoh during the last 2 weeks (last drink 2 days ago) and at risk for impending w/d - denies h/o DTs. Scored 8 on arrival, s/p valium, now a 2.   Pt found to be in new onset rapid afib with rates in 140s -> s/p 1L NS, rates in 90s. Heparin gtt started.     Serum Na: 124  Serum K: 3.1  Scr: 1.4  Vitals stable  Denies neurological deficits.   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%)  #r/o CAD  - tele & EKG w a fib  - c/w metoprolol 150mg BID (home med)  - IVF  - monitor lytes  - CHADS VASC: 2 (HTN, cardiomyopathy) > heparin drip for now, possible cath  - cardio consult appreciated  - monitor lytes      #Hyponatremia/Hypokalemia  - suspect 2/2 alcohol use, poor PO intake   - goal Na correction 6-8 meq in 24 hours  - Na 128 this AM   - replete PRN       #NOHEMI  - suspect pre-renal  - 1.3 now 1.1 s/p IVF      #HTN with cardiomyopathy (EF unknown)  - TTE  - c/w metoprolol  - cardio consult, possible cath tomorrow         #chronic transaminitis seen on outside records on [facility] dating back to 2019, no labs since then  - prior MRI in 2019 identified hepatic steatosis  - obtain RUQ sono  - GI consult as o/p    #thrombocytopenia  - Last labs outpatient 2017 show flucuating platelets from normal to low 130-140  - 147 on admission now 93 since initiating heparin (<50% drop)  - HIT score 3 points : low probability of HIT  - suspect thrombytopenia is chronic to some extent given alcohol history   - no sign/sx of clots currently   - will send Heparin AB/reflex       #ETOH withdrawal  #etoh abuse  - continue ciwa scale/diazepam taper  - mvi, thiamine, folate    #dvt ppx - heparin    #dispo - rate control, possible cath in AM ,    6/11: CIWA improved. HR improved. Psych consult/recs for assistance in anxiety control without increasing benzo use. Ischemic work up in 1-2 days if Withdrawl component remains stable.

## 2025-06-12 LAB
A1C WITH ESTIMATED AVERAGE GLUCOSE RESULT: 5.3 % — SIGNIFICANT CHANGE UP (ref 4–5.6)
ADD ON TEST-SPECIMEN IN LAB: SIGNIFICANT CHANGE UP
ANION GAP SERPL CALC-SCNC: 5 MMOL/L — SIGNIFICANT CHANGE UP (ref 5–17)
APTT BLD: 69.9 SEC — HIGH (ref 26.1–36.8)
BUN SERPL-MCNC: 15 MG/DL — SIGNIFICANT CHANGE UP (ref 7–23)
CALCIUM SERPL-MCNC: 9 MG/DL — SIGNIFICANT CHANGE UP (ref 8.5–10.1)
CHLORIDE SERPL-SCNC: 105 MMOL/L — SIGNIFICANT CHANGE UP (ref 96–108)
CHOLEST SERPL-MCNC: 159 MG/DL — SIGNIFICANT CHANGE UP
CO2 SERPL-SCNC: 26 MMOL/L — SIGNIFICANT CHANGE UP (ref 22–31)
CREAT SERPL-MCNC: 1.1 MG/DL — SIGNIFICANT CHANGE UP (ref 0.5–1.3)
EGFR: 84 ML/MIN/1.73M2 — SIGNIFICANT CHANGE UP
EGFR: 84 ML/MIN/1.73M2 — SIGNIFICANT CHANGE UP
ESTIMATED AVERAGE GLUCOSE: 105 MG/DL — SIGNIFICANT CHANGE UP (ref 68–114)
GLUCOSE SERPL-MCNC: 105 MG/DL — HIGH (ref 70–99)
HCT VFR BLD CALC: 40.4 % — SIGNIFICANT CHANGE UP (ref 39–50)
HDLC SERPL-MCNC: 72 MG/DL — SIGNIFICANT CHANGE UP
HGB BLD-MCNC: 13.6 G/DL — SIGNIFICANT CHANGE UP (ref 13–17)
LDLC SERPL-MCNC: 74 MG/DL — SIGNIFICANT CHANGE UP
LIPID PNL WITH DIRECT LDL SERPL: 74 MG/DL — SIGNIFICANT CHANGE UP
MCHC RBC-ENTMCNC: 33.7 G/DL — SIGNIFICANT CHANGE UP (ref 32–36)
MCHC RBC-ENTMCNC: 33.7 PG — SIGNIFICANT CHANGE UP (ref 27–34)
MCV RBC AUTO: 100 FL — SIGNIFICANT CHANGE UP (ref 80–100)
NONHDLC SERPL-MCNC: 87 MG/DL — SIGNIFICANT CHANGE UP
NRBC # BLD AUTO: 0 K/UL — SIGNIFICANT CHANGE UP (ref 0–0)
NRBC # FLD: 0 K/UL — SIGNIFICANT CHANGE UP (ref 0–0)
NRBC BLD AUTO-RTO: 0 /100 WBCS — SIGNIFICANT CHANGE UP (ref 0–0)
PLATELET # BLD AUTO: 117 K/UL — LOW (ref 150–400)
PMV BLD: 9.6 FL — SIGNIFICANT CHANGE UP (ref 7–13)
POTASSIUM SERPL-MCNC: 3.5 MMOL/L — SIGNIFICANT CHANGE UP (ref 3.5–5.3)
POTASSIUM SERPL-SCNC: 3.5 MMOL/L — SIGNIFICANT CHANGE UP (ref 3.5–5.3)
RBC # BLD: 4.04 M/UL — LOW (ref 4.2–5.8)
RBC # FLD: 13.7 % — SIGNIFICANT CHANGE UP (ref 10.3–14.5)
SODIUM SERPL-SCNC: 136 MMOL/L — SIGNIFICANT CHANGE UP (ref 135–145)
TRIGL SERPL-MCNC: 66 MG/DL — SIGNIFICANT CHANGE UP
WBC # BLD: 5.67 K/UL — SIGNIFICANT CHANGE UP (ref 3.8–10.5)
WBC # FLD AUTO: 5.67 K/UL — SIGNIFICANT CHANGE UP (ref 3.8–10.5)

## 2025-06-12 PROCEDURE — 99222 1ST HOSP IP/OBS MODERATE 55: CPT

## 2025-06-12 PROCEDURE — 99233 SBSQ HOSP IP/OBS HIGH 50: CPT

## 2025-06-12 PROCEDURE — 99152 MOD SED SAME PHYS/QHP 5/>YRS: CPT

## 2025-06-12 PROCEDURE — 93458 L HRT ARTERY/VENTRICLE ANGIO: CPT | Mod: 26

## 2025-06-12 PROCEDURE — 99232 SBSQ HOSP IP/OBS MODERATE 35: CPT

## 2025-06-12 RX ORDER — APIXABAN 2.5 MG/1
5 TABLET, FILM COATED ORAL EVERY 12 HOURS
Refills: 0 | Status: DISCONTINUED | OUTPATIENT
Start: 2025-06-12 | End: 2025-06-13

## 2025-06-12 RX ORDER — GABAPENTIN 400 MG/1
200 CAPSULE ORAL THREE TIMES A DAY
Refills: 0 | Status: DISCONTINUED | OUTPATIENT
Start: 2025-06-12 | End: 2025-06-13

## 2025-06-12 RX ORDER — GABAPENTIN 400 MG/1
100 CAPSULE ORAL ONCE
Refills: 0 | Status: COMPLETED | OUTPATIENT
Start: 2025-06-12 | End: 2025-06-12

## 2025-06-12 RX ORDER — GABAPENTIN 400 MG/1
200 CAPSULE ORAL THREE TIMES A DAY
Refills: 0 | Status: DISCONTINUED | OUTPATIENT
Start: 2025-06-12 | End: 2025-06-12

## 2025-06-12 RX ORDER — HEPARIN SODIUM 1000 [USP'U]/ML
2000 INJECTION INTRAVENOUS; SUBCUTANEOUS
Qty: 25000 | Refills: 0 | Status: DISCONTINUED | OUTPATIENT
Start: 2025-06-12 | End: 2025-06-12

## 2025-06-12 RX ORDER — HEPARIN SODIUM 1000 [USP'U]/ML
4000 INJECTION INTRAVENOUS; SUBCUTANEOUS EVERY 6 HOURS
Refills: 0 | Status: DISCONTINUED | OUTPATIENT
Start: 2025-06-12 | End: 2025-06-12

## 2025-06-12 RX ORDER — HEPARIN SODIUM 1000 [USP'U]/ML
9000 INJECTION INTRAVENOUS; SUBCUTANEOUS EVERY 6 HOURS
Refills: 0 | Status: DISCONTINUED | OUTPATIENT
Start: 2025-06-12 | End: 2025-06-12

## 2025-06-12 RX ADMIN — HEPARIN SODIUM 2000 UNIT(S)/HR: 1000 INJECTION INTRAVENOUS; SUBCUTANEOUS at 15:57

## 2025-06-12 RX ADMIN — METOPROLOL SUCCINATE 150 MILLIGRAM(S): 50 TABLET, EXTENDED RELEASE ORAL at 21:03

## 2025-06-12 RX ADMIN — Medication 100 MILLILITER(S): at 13:09

## 2025-06-12 RX ADMIN — HYDROXYZINE HYDROCHLORIDE 25 MILLIGRAM(S): 25 TABLET, FILM COATED ORAL at 19:44

## 2025-06-12 RX ADMIN — FOLIC ACID 1 MILLIGRAM(S): 1 TABLET ORAL at 09:40

## 2025-06-12 RX ADMIN — GABAPENTIN 200 MILLIGRAM(S): 400 CAPSULE ORAL at 21:03

## 2025-06-12 RX ADMIN — Medication 1 TABLET(S): at 09:40

## 2025-06-12 RX ADMIN — Medication 650 MILLIGRAM(S): at 13:09

## 2025-06-12 RX ADMIN — GABAPENTIN 100 MILLIGRAM(S): 400 CAPSULE ORAL at 05:51

## 2025-06-12 RX ADMIN — Medication 15 MILLILITER(S): at 09:40

## 2025-06-12 RX ADMIN — HYDROXYZINE HYDROCHLORIDE 25 MILLIGRAM(S): 25 TABLET, FILM COATED ORAL at 13:13

## 2025-06-12 RX ADMIN — HEPARIN SODIUM 2000 UNIT(S)/HR: 1000 INJECTION INTRAVENOUS; SUBCUTANEOUS at 05:50

## 2025-06-12 RX ADMIN — HEPARIN SODIUM 2000 UNIT(S)/HR: 1000 INJECTION INTRAVENOUS; SUBCUTANEOUS at 05:41

## 2025-06-12 RX ADMIN — GABAPENTIN 100 MILLIGRAM(S): 400 CAPSULE ORAL at 16:01

## 2025-06-12 RX ADMIN — Medication 100 MILLIGRAM(S): at 09:40

## 2025-06-12 RX ADMIN — METOPROLOL SUCCINATE 150 MILLIGRAM(S): 50 TABLET, EXTENDED RELEASE ORAL at 09:39

## 2025-06-12 RX ADMIN — HEPARIN SODIUM 2000 UNIT(S)/HR: 1000 INJECTION INTRAVENOUS; SUBCUTANEOUS at 07:32

## 2025-06-12 RX ADMIN — HEPARIN SODIUM 2000 UNIT(S)/HR: 1000 INJECTION INTRAVENOUS; SUBCUTANEOUS at 19:43

## 2025-06-12 RX ADMIN — GABAPENTIN 100 MILLIGRAM(S): 400 CAPSULE ORAL at 14:46

## 2025-06-12 RX ADMIN — APIXABAN 5 MILLIGRAM(S): 2.5 TABLET, FILM COATED ORAL at 22:26

## 2025-06-12 NOTE — PROGRESS NOTE ADULT - ASSESSMENT
MEDICATIONS  (STANDING):  apixaban 5 milliGRAM(s) Oral every 12 hours  gabapentin 200 milliGRAM(s) Oral three times a day  heparin  Infusion. 2000 Unit(s)/Hr (20 mL/Hr) IV Continuous <Continuous>  metoprolol tartrate 150 milliGRAM(s) Oral two times a day  multivitamin/minerals/iron Oral Solution (CENTRUM) 15 milliLiter(s) Oral daily  sodium chloride 0.9%. 1000 milliLiter(s) (50 mL/Hr) IV Continuous <Continuous>  sodium chloride 0.9%. 1000 milliLiter(s) (100 mL/Hr) IV Continuous <Continuous>    MEDICATIONS  (PRN):  acetaminophen     Tablet .. 650 milliGRAM(s) Oral every 6 hours PRN Mild Pain (1 - 3)  heparin   Injectable 9000 Unit(s) IV Push every 6 hours PRN For aPTT less than 40  heparin   Injectable 4000 Unit(s) IV Push every 6 hours PRN For aPTT between 40 - 57  hydrOXYzine hydrochloride 25 milliGRAM(s) Oral every 6 hours PRN Anxiety  metoprolol tartrate Injectable 5 milliGRAM(s) IV Push every 6 hours PRN HR sustained >120  ondansetron Injectable 4 milliGRAM(s) IV Push every 6 hours PRN Nausea and/or Vomiting        "45 year old male with PMHx of cardiomyopathy, HTN; presents to ED c/o chest pain. Patient states he thinks he is experiencing EtOH withdrawal symptoms.  Patient states he was drinking EtOH for about 11 days and recently stopped about 2 days ago. Last drink, friday evening. Patient states he has done this in the past and feels like his withdrawal symptoms worsen over the years.  Denies history of withdrawal seizures. Former smoker. Does not have cardiologist."    46 yo M with pmh etoh abuse, HTN, cardiomyopathy, h/o etoh pancreatitis , chronic transaminitis p/w vague pleuritic chest discomfort and presented after he was experiencing withdrawal ssx. Consumed large amounts of etoh during the last 2 weeks (last drink 2 days ago) and at risk for impending w/d - denies h/o DTs. Scored 8 on arrival, s/p valium, now a 2.   Pt found to be in new onset rapid afib with rates in 140s -> s/p 1L NS, rates in 90s. Heparin gtt started.     Serum Na: 124  Serum K: 3.1  Scr: 1.4  Vitals stable  Denies neurological deficits.   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%)  #r/o CAD  - tele & EKG w a fib  - c/w metoprolol 150mg BID (home med)  - IVF  - monitor lytes  - CHADS VASC: 2 (HTN, cardiomyopathy) > heparin drip for now, possible cath-> s/p LHC (6/12)-Non-obstructive CAD  - cardio consult appreciated  - monitor lytes      #Hyponatremia/Hypokalemia  - suspect 2/2 alcohol use, poor PO intake   - goal Na correction 6-8 meq in 24 hours  - Na 128 this AM   - replete PRN       #NOHEMI  - suspect pre-renal  - 1.3 now 1.1 s/p IVF      #HTN with cardiomyopathy (EF unknown)  - TTE  - c/w metoprolol  - cardio consult, possible cath tomorrow         #chronic transaminitis seen on outside records on HIE dating back to 2019, no labs since then  - prior MRI in 2019 identified hepatic steatosis  - obtain RUQ sono  - GI consult as o/p    #thrombocytopenia  - Last labs outpatient 2017 show flucuating platelets from normal to low 130-140  - 147 on admission now 93 since initiating heparin (<50% drop)  - HIT score 3 points : low probability of HIT  - suspect thrombytopenia is chronic to some extent given alcohol history   - no sign/sx of clots currently   - will send Heparin AB/reflex       #ETOH withdrawal  #etoh abuse  - continue ciwa scale/diazepam taper  - mvi, thiamine, folate    #dvt ppx - heparin    #dispo -     6/12: Ciwa stable. Off taper. DC CIWA protocol. s/p [facility] with non-obstructive CAD. Heparin ggt to eliquis tonight. DIscharge planning tentatively tomorrow with cardiology/HF recs in place.

## 2025-06-12 NOTE — PROGRESS NOTE ADULT - SUBJECTIVE AND OBJECTIVE BOX
HOSPITALIST PROGRESS NOTE      HPI - 46 yo M with pmh etoh abuse, HTN, cardiomyopathy, h/o etoh pancreatitis , chronic transaminitis p/w vague pleuritic chest discomfort and presented after he was experiencing withdrawal ssx. Consumed large amounts of etoh during the last 2 weeks (last drink 2 days ago) and at risk for impending w/d - denies h/o DTs. Scored 8 on arrival, s/p valium, now a 2.   Pt found to be in new onset rapid afib with rates in 140s -> s/p 1L NS, rates in 90s. Heparin gtt started.     6/9 - Pt feels fine, no Chest pain or SOB, HR up to 130s at times   6/10: CIWA ~8. HR improved. Cardiology recs. Continue CIWA and librium taper.   6/11: CIWA improved. HR improved. Psych consult/recs for assistance in anxiety control without increasing benzo use. Ischemic work up in 1-2 days if Withdrawl component remains stable.   6/12: Ciwa stable. Off taper. DC CIWA protocol. s/p LHC with non-obstructive CAD. Heparin ggt to [facility]iqu[facility] tonight. DIscharge planning tentatively tomorrow with cardiology/HF recs in place.     Review of Systems: 14 Point review of systems reviewed and reported as negative unless otherwise stated above    VITALS  Vital Signs Last 24 Hrs  T(C): 36.7 (06-12-25 @ 11:25), Max: 36.9 (06-11-25 @ 23:46)  HR: 68 (06-12-25 @ 14:00) (59 - 101)  BP: 108/71 (06-12-25 @ 14:00) (96/72 - 118/76)  RR: 16 (06-12-25 @ 14:00) (16 - 18)  SpO2: 96% (06-12-25 @ 14:00) (94% - 99%)  Patient On (Oxygen Delivery Method): room air        CAPILLARY BLOOD GLUCOSE          PHYSICAL EXAM  General: NAD, sitting comfortably in bed   HEENT: EOMI, no scleral icterus, dry MM  Respiratory: lungs CTA b/l, no wheezes/crackles, no accessory muscle use  Cardiovascular: Regular rhythm/rate; +S1 +S2  Gastrointestinal: Soft, NTND  Extremities: WWP, no cyanosis, no edema, pulses equal  Neurological: A&Ox3, no gross focal deficits, follows commands  Skin: Normal temperature, warm, dry          No Known Allergies      LABS                                13.6   5.67  )-----------( 117      ( 12 Jun 2025 05:09 )             40.4     06-12    136  |  105  |  15  ----------------------------<  105[H]  3.5   |  26  |  1.10    Ca    9.0      12 Jun 2025 05:09  Phos  2.9     06-11  Mg     2.7     06-11    TPro  6.6  /  Alb  2.8[L]  /  TBili  0.8  /  DBili  x   /  AST  32  /  ALT  43  /  AlkPhos  49  06-11      CAPILLARY BLOOD GLUCOSE                               13.9   5.86  )-----------( 83       ( 11 Jun 2025 05:34 )             40.5     06-11    135  |  102  |  17  ----------------------------<  98  3.5   |  30  |  1.18    Ca    8.5      11 Jun 2025 05:34  Phos  2.9     06-11  Mg     2.7     06-11    TPro  6.6  /  Alb  2.8[L]  /  TBili  0.8  /  DBili  x   /  AST  32  /  ALT  43  /  AlkPhos  49  06-11      CAPILLARY BLOOD GLUCOSE                                15.3   8.96  )-----------( 87       ( 10 Tello 2025 05:12 )             43.3     06-10    134[L]  |  100  |  17  ----------------------------<  101[H]  3.4[L]   |  29  |  1.33[H]    Ca    8.6      10 Tello 2025 05:12    TPro  7.0  /  Alb  3.3  /  TBili  1.7[H]  /  DBili  x   /  AST  63[H]  /  ALT  69  /  AlkPhos  51  06-09      CAPILLARY BLOOD GLUCOSE                           17.1   10.90 )-----------( 94       ( 09 Jun 2025 09:29 )             47.2     06-09    128[L]  |  94[L]  |  10  ----------------------------<  104[H]  3.3[L]   |  27  |  1.10    Ca    8.8      09 Jun 2025 09:29  Mg     2.2     06-08    TPro  7.0  /  Alb  3.3  /  TBili  1.7[H]  /  DBili  x   /  AST  63[H]  /  ALT  69  /  AlkPhos  51  06-09    PT/INR - ( 08 Jun 2025 10:13 )   PT: 11.5 sec;   INR: 0.97 ratio         PTT - ( 09 Jun 2025 09:29 )  PTT:72.2 sec  Urinalysis Basic - ( 09 Jun 2025 09:29 )    Color: x / Appearance: x / SG: x / pH: x  Gluc: 104 mg/dL / Ketone: x  / Bili: x / Urobili: x   Blood: x / Protein: x / Nitrite: x   Leuk Esterase: x / RBC: x / WBC x   Sq Epi: x / Non Sq Epi: x / Bacteria: x    RADIOLOGY:    < from: US Abdomen Upper Quadrant Right (06.08.25 @ 18:20) >  IMPRESSION:  1. Mild hepatosplenomegaly with diffuse increased echogenicity, likely   due to hepatic steatosis.  2. Gallbladder sludge without evidence of cholecystitis.    < end of copied text >  < from: Xray Chest 1 View- PORTABLE-Urgent (06.08.25 @ 10:48) >  IMPRESSION:    Unremarkable frontal chest x ray    < end of copied text >      EKG:    < from: 12 Lead ECG (06.08.25 @ 09:38) >  Diagnosis Line Atrial fibrillation with rapid ventricular response  Abnormal ECG    < end of copied text >      ECHO:    < from: TTE W or WO Ultrasound Enhancing Agent (06.08.25 @ 13:22) >  CONCLUSIONS:      1. The left ventricular cavity is mildly dilated. Left ventricular systolic function is mildly decreased with an ejection fraction estimated at 45 to 50 %.   2. Normal right ventricular size and function.   3. Left atrium is mildly dilated.    < end of copied text >      RADIOLOGY & ADDITIONAL TESTS: Reviewed

## 2025-06-12 NOTE — CONSULT NOTE ADULT - NS ATTEND AMEND GEN_ALL_CORE FT
Patient seen and examined bedside.   Patient has no acute complaints.   Admitted with palpitations, and alcohol withdrawal.   EF mildly reduced and new onset afib.   Patient was explained the risks and benefits of LHC and he agreed to proceed.   LHC showed mild non-obstructive CAD.   Consider cardioversion.

## 2025-06-12 NOTE — PACU DISCHARGE NOTE - COMMENTS
pt a+o x4. vital signs stable. Pt safety maintained.  Rt radial site benign, DGS dry and intact, no hematoma or bleeding noted. Site soft & nontender, +2 palpable pulses bilaterally. Bilateral upper extremities warm/intact/mobile. PIVL site benign. Skin intact. Report given to accepting RN: Erin , Transferred via stretcher with CM in use in stable condition.

## 2025-06-12 NOTE — PROGRESS NOTE ADULT - SUBJECTIVE AND OBJECTIVE BOX
Department of Cardiology                                                               Division of Interventional Cardiology                                                               [facility] /[address] [address]                                                                                 [phone]           Post Procedure Progress Note  Patient is a 45y old  Male who presents with a chief complaint of chest discomfort (12 Jun 2025 11:59)      Patient is  now s/p left heart catheterization    s/p LHC : mild non obs disease, mildly reduced EF  Pt denies chest pain/SOB/palpitations post cath.  PROCEDURE SITE: --RRA accessed hemoband to be removed 1 hour post placement. --- Site is without hematoma or bleeding. Sensation and SWAPNIL intact. Distal pulses palpable 2+, capillary refill < 2 seconds.       Vital Signs  T(C): 36.7 (06-12-25 @ 11:25), Max: 36.9 (06-11-25 @ 23:46)  HR: 66 (06-12-25 @ 11:25) (59 - 101)  BP: 114/84 (06-12-25 @ 11:25) (102/64 - 118/76)  RR: 18 (06-12-25 @ 11:25) (16 - 18)  SpO2: 98% (06-12-25 @ 11:25) (93% - 100%)  Wt(kg): --    Home Medications:  amLODIPine 10 mg oral tablet: 1 tab(s) orally once a day (08 Jun 2025 10:48)  hydroCHLOROthiazide 25 mg oral tablet: 1 tab(s) orally once a day (08 Jun 2025 10:48)  Metoprolol Tartrate 100 mg oral tablet: 1.5 tab(s) orally 2 times a day (08 Jun 2025 12:36)    MEDICATIONS  (STANDING):  folic acid 1 milliGRAM(s) Oral daily  gabapentin 100 milliGRAM(s) Oral three times a day  heparin  Infusion. 2000 Unit(s)/Hr (20 mL/Hr) IV Continuous <Continuous>  metoprolol tartrate 150 milliGRAM(s) Oral two times a day  multivitamin 1 Tablet(s) Oral daily  multivitamin/minerals/iron Oral Solution (CENTRUM) 15 milliLiter(s) Oral daily  sodium chloride 0.9%. 1000 milliLiter(s) (50 mL/Hr) IV Continuous <Continuous>  sodium chloride 0.9%. 1000 milliLiter(s) (100 mL/Hr) IV Continuous <Continuous>  thiamine 100 milliGRAM(s) Oral daily      PHYSICAL EXAM:  NEURO: Non-focal, AxOx3.  No neuro deficits   CHEST/LUNG: Clear to auscultation bilaterally; No wheeze  HEART: s1 s2 Regular rate and rhythm; No murmurs, rubs, or gallops  ABDOMEN: Soft, Nontender, Nondistended; Bowel sounds present X 4 quadrants   EXTREMITIES:  2+ Peripheral Pulses, No clubbing, cyanosis, or edema   VASCULAR: Peripheral pulses palpable 2+ bilaterally      PROCEDURE RESULTS:  full report to follow               PLAN:  -VS, diet, activity as per post cath orders  - IVF per JUDY protocol   -Encourage PO fluids  -Continue current medications  - resume hep gtt at previous rate 2 hours post hemoband removal   -Post cath instructions reviewed, post sedation instructions reviewed; patient verbalizes and understands instructions  -Discussed therapeutic lifestyle changes to reduce risk factors such as following a cardiac diet, weight loss, maintaining a healthy weight, exercise, smoking cessation, medication compliance, and regular follow-up  with PCP/Cardioloigst  -Plan of care discussed with patient, RN and Dr. Salomon  - rest of care per primary and general cardiology

## 2025-06-12 NOTE — PROGRESS NOTE ADULT - ASSESSMENT
44 yo male with a hx unspecified cardiomyopathy, hypertension, alcohol use disorder presenting with substernal chest pain. Admitted for alcohol withdrawal with course c/b new Afib with RVR    Chest pain   Currently CP free, troponin negative, EKG no Ischemic ST changes, Echo with diminished LV FX of unclear chronicity, CP possibly secondary to Afib with RVR but given hx tobacco use, family hx CAD recent exertional dyspnea with depressed LV FX recommend cardiac cath when improved from withdrawal standpoint. Hold off on initiating DOAC CW Heparin GTT  -  interventional cardiology consulted for cath today    New Afib with RVR   likely due to alcohol withdrawal. Depressed LV FX, LA mildly dilated, TSH normal, OPT sleep study   HR improved  CW BB/IV Heparin  maintain K>4, Mg>2    Cardiomyopathy  Unclear etiology and chronicity--pt does not recall having a cardiac cath but thinks he was [facility] a [facility] who he has not been seeing   appears clinically compensated, , CXR Unremarkable  will obtain cath as above  favor ACE/ARB post cath over home CCB/HCTZ for reduced LV function/HTN    HTN   as above - controlled at this time   outpatient cardiologist: none.  will need to establish with Daisy on DC

## 2025-06-12 NOTE — PROGRESS NOTE ADULT - SUBJECTIVE AND OBJECTIVE BOX
CHIEF COMPLAINT: chest pain       HPI:  44 yo male with a hx unspecified cardiomyopathy, hypertension, alcohol use disorder presenting with substernal chest pain. Pt reports feeling like he was experiencing alcohol withdrawal sx after 1-2 weeks of binge drinking (at least one bottle of wine daily). He has been hospitalized for withdrawal before but denies hx seizures. c/o  intermittent palpitations for several months to years and recent exertional dyspnea.     [facility]. reports remote hx catheterization - does not recall being told he had coronary disease.   Has not followed with cardiologists recently.      Family hx notable for father with CAD s/p CABG age 50s   personal hx tobacco use, recent intermittent vaping  occasional cannabis use. +daily alcohol at least one bottle wine daily     Noted to have Afib with RVR for which cardiology was consulted.     6/10/25: Pt denies cp or sob.  Feels jittery.  Medicated with diazepam for anxiety this am.  Tele: Afib with 's-150's sustained  25: Seen today no CP/SOB still with mild tremors" (ETOH/anxiety) Tele AF HR improved over the past 24 HRS (has brief Non sustained rapid rates with activity )  25:  Pt seen earlier this am laying in bed in NAD.  States he is feeling better today.   Denies tremors, CP, SOB, palpitations, lightheadedness.  Tele Afib 60-90s (was rapid Afib until 7:30pm yesterday, remained rate controlled since then, also had 6 sec HR 44 at 5:30am)      PAST MEDICAL/SURGICAL/FAMILY/SOCIAL HISTORY:   Asthma childhood - pt states does not have aymore  HTN (hypertension)   Pancreatitis due to common bile duct stone 2017  Pneumonia 1 month ago.     PAST SURGICAL HISTORY:  S/P right knee arthroscopy 2 in   1 in .     FAMILY HISTORY:  No pertinent family history in first degree relatives.    SOCIAL HISTORY  Former smoker  +ETOH    FAMILY HISTORY:   No pertinent family history in first degree relatives    Allergies  No Known Allergies      MEDICATIONS  Home Medications:  amLODIPine 10 mg oral tablet: 1 tab(s) orally once a day   hydroCHLOROthiazide 25 mg oral tablet: 1 tab(s) orally once a day   Metoprolol Tartrate 100 mg oral tablet: 1.5 tab(s) orally 2 times a day    MEDICATIONS  (STANDING):  folic acid 1 milliGRAM(s) Oral daily  gabapentin 100 milliGRAM(s) Oral three times a day  heparin  Infusion. 1600 Unit(s)/Hr (16 mL/Hr) IV Continuous <Continuous>  metoprolol tartrate 150 milliGRAM(s) Oral two times a day  multivitamin 1 Tablet(s) Oral daily  multivitamin/minerals/iron Oral Solution (CENTRUM) 15 milliLiter(s) Oral daily  sodium chloride 0.9%. 1000 milliLiter(s) (50 mL/Hr) IV Continuous <Continuous>  thiamine 100 milliGRAM(s) Oral daily    MEDICATIONS  (PRN):  acetaminophen     Tablet .. 650 milliGRAM(s) Oral every 6 hours PRN Mild Pain (1 - 3)  diazepam  Injectable 10 milliGRAM(s) IV Push every 1 hour PRN CIWA 8-14  diazepam  Injectable 20 milliGRAM(s) IV Push every 1 hour PRN CIWA 15 or greater, or seizure  heparin   Injectable 9000 Unit(s) IV Push every 6 hours PRN For aPTT less than 40  heparin   Injectable 4000 Unit(s) IV Push every 6 hours PRN For aPTT between 40 - 57  hydrOXYzine hydrochloride 25 milliGRAM(s) Oral every 6 hours PRN Anxiety  metoprolol tartrate Injectable 5 milliGRAM(s) IV Push every 6 hours PRN HR sustained >120  ondansetron Injectable 4 milliGRAM(s) IV Push every 6 hours PRN Nausea and/or Vomiting      Vital Signs Last 24 Hrs  T(C): 36.7 (2025 11:25), Max: 36.9 (2025 23:46)  T(F): 98.1 (2025 11:25), Max: 98.5 (2025 23:46)  HR: 66 (2025 11:25) (59 - 101)  BP: 114/84 (2025 11:25) (102/64 - 118/76)  BP(mean): --  RR: 18 (2025 11:25) (16 - 18)  SpO2: 98% (2025 11:25) (93% - 100%)    Parameters below as of 2025 11:25  Patient On (Oxygen Delivery Method): room air    Daily Height in cm: 185.42 (2025 11:25)    Daily Weight in k.8 (2025 05:33)      PHYSICAL EXAM:  Constitutional: NAD, awake and alert  HEENT:  EOMI,  Pupils round, No oral cyanosis.  Pulmonary: Non-labored, breath sounds are clear bilaterally  Cardiovascular: Irregular  Gastrointestinal: Abd soft, nontender.   Lymph: No peripheral edema.  Neurological: Alert, no focal deficits      LABS:                        13.6   5.67  )-----------( 117      ( 2025 05:09 )             40.4                         15.3   8.96  )-----------( 87       ( 10 Tello 2025 05:12 )             43.3                         17.1   10.90 )-----------( 94       ( 2025 09:29 )             47.2                         17.4   13.94 )-----------( 124      ( 2025 18:31 )             47.9                         17.7   11.16 )-----------( 147      ( 2025 10:13 )             46.9   06-12    136  |  105  |  15  ----------------------------<  105[H]  3.5   |  26  |  1.10    Ca    9.0      2025 05:09  Phos  2.9     06-11  Mg     2.7     06-11    TPro  6.6  /  Alb  2.8[L]  /  TBili  0.8  /  DBili  x   /  AST  32  /  ALT  43  /  AlkPhos  49  06-11    06-10    134[L]  |  100  |  17  ----------------------------<  101[H]  3.4[L]   |  29  |  1.33[H]    Ca    8.6      10 Tello 2025 05:12  Mg     2.2     06-08    TPro  7.0  /  Alb  3.3  /  TBili  1.7[H]  /  DBili  x   /  AST  63[H]  /  ALT  69  /  AlkPhos  51  06-09    2025 09:29    128    |  94     |  10     ----------------------------<  104    3.3     |  27     |  1.10   2025 18:31    126    |  89     |  12     ----------------------------<  118    3.5     |  31     |  1.21   2025 10:13    124    |  85     |  16     ----------------------------<  149    3.1     |  30     |  1.36     Ca    8.8        2025 09:29  Ca    8.5        2025 18:31  Ca    8.3        2025 10:13  Mg     2.2       2025 10:13    TPro  7.0    /  Alb  3.3    /  TBili  1.7    /  DBili  x      /  AST  63     /  ALT  69     /  AlkPhos  51     2025 09:29  TPro  6.6    /  Alb  3.3    /  TBili  2.4    /  DBili  x      /  AST  119    /  ALT  105    /  AlkPhos  78     2025 10:13    PT/INR - ( 2025 10:13 )   PT: 11.5 sec;   INR: 0.97 ratio      PTT - ( 2025 09:29 )  PTT:72.2 sec    06- @ 09:29  TSH: 1.19    trop () 13.5--> () 6.0  BNP () 134      EKG / CARDIAC TESTING  < from: 12 Lead ECG (25 @ 09:38) >  Diagnosis Line Atrial fibrillation with rapid ventricular response  Abnormal ECG  < end of copied text >    < from: TTE W or WO Ultrasound Enhancing Agent (25 @ 13:22) >   1. The left ventricular cavity is mildly dilated. Left ventricular systolic function is mildly decreased with an ejection fraction estimated at 45 to 50 %.   2. Normal right ventricular size and function.   3. Left atrium is mildly dilated.  Aortic Valve:  The aortic valve appears trileaflet with normal systolic excursion. There is no aortic valve stenosis. The peak transaortic velocity is 1.29 m/s and peak transaortic gradient is 6.7 mmHg. There is trace aortic regurgitation.  Mitral Valve:  Structurally normal mitral valve with normal leaflet excursion. There is no calcification of the mitral valve annulus. There is no mitral valve stenosis. There is trace mitral regurgitation.  Tricuspid Valve:  There is no evidence of tricuspid stenosis. There is no evidence of tricuspid regurgitation. There is no echocardiographic evidence of pulmonary hypertension.  Pulmonic Valve:  Structurally normal pulmonic valve with normal leaflet excursion. There is no pulmonic valve stenosis. There is trace pulmonic regurgitation.  Aorta:  The aortic arch is not well visualized. The aortic root at the sinuses of Valsalva is normal in size, measuring 3.30 cm (indexed 1.43 cm/m²). The ascending aorta is normal in size, measuring 3.51 cm (indexed 1.53 cm/m²).  Pericardium:  No pericardial effusion seen.  < end of copied text     RADIOLOGY  < from: Xray Chest 1 View- PORTABLE-Urgent (25 @ 10:48) >  The cardiomediastinal silhouette is normal and the joe are not enlarged.   The trachea is midline. There is no focal lung consolidation or sizable   pleural effusion. No significant osseous abnormality.  IMPRESSION:  Unremarkable frontal chest x ray  < end of copied text >    < from: US Abdomen Upper Quadrant Right (25 @ 18:20) >  1. Mild hepatosplenomegaly with diffuse increased echogenicity, likely   due to hepatic steatosis.  2. Gallbladder sludge without evidence of cholecystitis.  < end of copied text >

## 2025-06-12 NOTE — CONSULT NOTE ADULT - SUBJECTIVE AND OBJECTIVE BOX
Department of Cardiology                                                               Division of Interventional Cardiology                                                               [facility] /[address] [address]                                                                                 [phone]           Interventional Cardiology Consult     Patient is a 45y old  Male who presents with a chief complaint of chest discomfort (11 Jun 2025 16:00)      Interventional cardiology consulted for  new onset Afib. Pt examined in room, A&Ox3, denies CP/SOB at this time. Pt went on a 12 day drinking binge and came into [facility] for DT's. He currently does not display any active withdrawl. The patient was found to be in new onset Afib which is now rate controlled. He has a long hx of smoking ( quit 11yrs ago) but continues to vape, his father had CABG at age 50 and his grandfather was dx with CHF. Given the risk factors pt is being brought to the cardiac cath lab for evaluation.         PREVIOUS DIAGNOSTIC TESTING  ECHO FINDINGS:   < from: TTE W or WO Ultrasound Enhancing Agent (06.08.25 @ 13:22) >  CONCLUSIONS:      1. The left ventricular cavity is mildly dilated. Left ventricular systolic function is mildly decreased with an ejection fraction estimated at 45 to 50 %.   2. Normal right ventricular size and function.   3. Left atrium is mildly dilated.    < end of copied text >    STRESS FINDINGS:  N /A   CATHETERIZATION: FINDINGS:  N/A  EKG  < from: 12 Lead ECG (06.08.25 @ 09:38) >  Diagnosis Line Atrial fibrillation with rapid ventricular response  Abnormal ECG  Confirmed by Dony Ward ([id number]) on 6/9/2025 11:44:02 AM    < end of copied text >      Vital Signs  Vital Signs Last 24 Hrs  T(C): 36.7 (12 Jun 2025 11:25), Max: 36.9 (11 Jun 2025 23:46)  T(F): 98.1 (12 Jun 2025 11:25), Max: 98.5 (11 Jun 2025 23:46)  HR: 66 (12 Jun 2025 11:25) (59 - 101)  BP: 114/84 (12 Jun 2025 11:25) (102/64 - 118/76)  BP(mean): --  RR: 18 (12 Jun 2025 11:25) (16 - 18)  SpO2: 98% (12 Jun 2025 11:25) (93% - 100%)    Parameters below as of 12 Jun 2025 11:25  Patient On (Oxygen Delivery Method): room air          Labs:                        13.6   5.67  )-----------( 117      ( 12 Jun 2025 05:09 )             40.4     06-12    136  |  105  |  15  ----------------------------<  105[H]  3.5   |  26  |  1.10    Ca    9.0      12 Jun 2025 05:09  Phos  2.9     06-11  Mg     2.7     06-11    TPro  6.6  /  Alb  2.8[L]  /  TBili  0.8  /  DBili  x   /  AST  32  /  ALT  43  /  AlkPhos  49  06-11    PTT - ( 12 Jun 2025 05:09 )  PTT:69.9 sec          MEDICATIONS  (STANDING):  folic acid 1 milliGRAM(s) Oral daily  gabapentin 100 milliGRAM(s) Oral three times a day  heparin  Infusion. 1600 Unit(s)/Hr (16 mL/Hr) IV Continuous <Continuous>  metoprolol tartrate 150 milliGRAM(s) Oral two times a day  multivitamin 1 Tablet(s) Oral daily  multivitamin/minerals/iron Oral Solution (CENTRUM) 15 milliLiter(s) Oral daily  sodium chloride 0.9%. 1000 milliLiter(s) (50 mL/Hr) IV Continuous <Continuous>  thiamine 100 milliGRAM(s) Oral daily    MEDICATIONS  (PRN):  acetaminophen     Tablet .. 650 milliGRAM(s) Oral every 6 hours PRN Mild Pain (1 - 3)  diazepam  Injectable 10 milliGRAM(s) IV Push every 1 hour PRN CIWA 8-14  diazepam  Injectable 20 milliGRAM(s) IV Push every 1 hour PRN CIWA 15 or greater, or seizure  heparin   Injectable 9000 Unit(s) IV Push every 6 hours PRN For aPTT less than 40  heparin   Injectable 4000 Unit(s) IV Push every 6 hours PRN For aPTT between 40 - 57  hydrOXYzine hydrochloride 25 milliGRAM(s) Oral every 6 hours PRN Anxiety  metoprolol tartrate Injectable 5 milliGRAM(s) IV Push every 6 hours PRN HR sustained >120  ondansetron Injectable 4 milliGRAM(s) IV Push every 6 hours PRN Nausea and/or Vomiting      PHYSICAL EXAM  GENERAL: NAD, AAOx3  CHEST/LUNG: Clear to auscultation bilaterally; No wheeze  HEART: RRIR; No murmurs, rubs, or gallops  ABDOMEN: Soft, Nontender, Nondistended; Bowel sounds present X 4 quadrants   EXTREMITIES:  2+ Peripheral Pulses, No clubbing, cyanosis, or edema  Psych: labile       ASA class:  II  GFR:  84  Bleeding  Risk score:  0.7%  Paul Score: 1pt  JUDY prehydration  LHC   -Consent obtained by IC for cardiac catheterization w/ coronary angiogram, possible sedation and/or analgesia and possible stent placement. Pt is competent, has capacity, and understands risks and benefits of procedure. Risks and benefits discussed. Risk discussed included, but not limited to MI, stroke, mortality, major bleeding, arrythmia, or infection. All questions answered         
  CHIEF COMPLAINT: chest pain       HPI:  44 yo male with a hx unspecified cardiomyopathy, hypertension, alcohol use disorder presenting with substernal chest pain. Pt reports feeling like he was experiencing alcohol withdrawal sx after 1-2 weeks of binge drinking (at least one bottle of wine daily). He has been hospitalized for withdrawal before but denies hx seizures. c/o  intermittent palpitations for several months to years and recent exertional dyspnea.     [facility]. reports remote hx catheterization - does not recall being told he had coronary disease.   Has not followed with cardiologists recently.      Family hx notable for father with CAD s/p CABG age 50s   personal hx tobacco use, recent intermittent vaping  occasional cannabis use. +daily alcohol at least one bottle wine daily     Noted to have Afib with RVR for which cardiology was consulted.       PAST MEDICAL/SURGICAL/FAMILY/SOCIAL HISTORY:    Past Medical, Past Surgical, and Family History:  PAST MEDICAL HISTORY:  Asthma childhood - pt states does not have aymore    HTN (hypertension)     Pancreatitis due to common bile duct stone January 2017    Pneumonia 1 month ago.     PAST SURGICAL HISTORY:  S/P right knee arthroscopy 2 in 2003  1 in 2004.     FAMILY HISTORY:  No pertinent family history in first degree relatives.     Tobacco Usage:  · Tobacco Usage	Former smoker     (08 Jun 2025 12:05)      PAST MEDICAL / SURGICAL HISTORY:  PAST MEDICAL & SURGICAL HISTORY:  Pneumonia  1 month ago      Asthma  childhood - pt states does not have aymore      Pancreatitis due to common bile duct stone  January 2017      HTN (hypertension)      S/P right knee arthroscopy  2 in 2003  1 in 2004          SOCIAL HISTORY:   Alcohol: Denied  Smoking: Nonsmoker  Drug Use: Denied  Marital Status:     FAMILY HISTORY: FAMILY HISTORY:  No pertinent family history in first degree relatives        MEDICATIONS  (STANDING):  diazepam  Injectable   IV Push   diazepam  Injectable 10 milliGRAM(s) IV Push every 8 hours  folic acid 1 milliGRAM(s) Oral daily  heparin  Infusion.  Unit(s)/Hr (19 mL/Hr) IV Continuous <Continuous>  metoprolol tartrate 150 milliGRAM(s) Oral two times a day  multivitamin 1 Tablet(s) Oral daily  multivitamin/minerals/iron Oral Solution (CENTRUM) 15 milliLiter(s) Oral daily  potassium chloride    Tablet ER 40 milliEquivalent(s) Oral every 4 hours  sodium chloride 0.9%. 1000 milliLiter(s) (50 mL/Hr) IV Continuous <Continuous>  thiamine 100 milliGRAM(s) Oral daily    MEDICATIONS  (PRN):  acetaminophen     Tablet .. 650 milliGRAM(s) Oral every 6 hours PRN Mild Pain (1 - 3)  diazepam  Injectable 10 milliGRAM(s) IV Push every 1 hour PRN CIWA 8-14  diazepam  Injectable 20 milliGRAM(s) IV Push every 1 hour PRN CIWA 15 or greater, or seizure  heparin   Injectable 9000 Unit(s) IV Push every 6 hours PRN For aPTT less than 40  heparin   Injectable 4000 Unit(s) IV Push every 6 hours PRN For aPTT between 40 - 57  ondansetron Injectable 4 milliGRAM(s) IV Push every 6 hours PRN Nausea and/or Vomiting      Allergies    No Known Allergies    Intolerances        REVIEW OF SYSTEMS:  CONSTITUTIONAL: No weakness, fevers or chills  Eyes: No visual changes  NECK: No pain or stiffness  RESPIRATORY: No cough, wheezing, hemoptysis; + shortness of breath  CARDIOVASCULAR: +chest pain, palpitations  GASTROINTESTINAL: No abdominal pain. No nausea, vomiting, or hematemesis; No diarrhea or constipation. No melena or hematochezia.  GENITOURINARY: No dysuria, frequency or hematuria  NEUROLOGICAL: No numbness.  SKIN: No itching or rash  All other review of systems is negative unless indicated above    VITAL SIGNS:   Vital Signs Last 24 Hrs  T(C): 36.9 (09 Jun 2025 08:00), Max: 37 (08 Jun 2025 15:00)  T(F): 98.5 (09 Jun 2025 08:00), Max: 98.6 (08 Jun 2025 15:00)  HR: 100 (09 Jun 2025 08:00) (72 - 107)  BP: 113/85 (09 Jun 2025 08:00) (96/61 - 129/69)  BP(mean): 85 (08 Jun 2025 21:00) (85 - 85)  RR: 18 (09 Jun 2025 08:00) (18 - 20)  SpO2: 96% (09 Jun 2025 08:00) (96% - 99%)    Parameters below as of 09 Jun 2025 08:00  Patient On (Oxygen Delivery Method): room air        I&O's Summary      PHYSICAL EXAM:  Constitutional: NAD, awake and alert  HEENT:  EOMI,  Pupils round, No oral cyanosis.  Pulmonary: Non-labored, breath sounds are clear bilaterally, No wheezing, rales or rhonchi  Cardiovascular:irregularly irregular, tachycardic  Gastrointestinal: Bowel Sounds present, soft, nontender.   Lymph: No peripheral edema. No cervical lymphadenopathy.  Neurological: Alert, no focal deficits  Skin: No rashes.  Psych:  Mood & affect appropriate    LABS:                        17.1   10.90 )-----------( 94       ( 09 Jun 2025 09:29 )             47.2                         17.4   13.94 )-----------( 124      ( 08 Jun 2025 18:31 )             47.9                         17.7   11.16 )-----------( 147      ( 08 Jun 2025 10:13 )             46.9     09 Jun 2025 09:29    128    |  94     |  10     ----------------------------<  104    3.3     |  27     |  1.10   08 Jun 2025 18:31    126    |  89     |  12     ----------------------------<  118    3.5     |  31     |  1.21   08 Jun 2025 10:13    124    |  85     |  16     ----------------------------<  149    3.1     |  30     |  1.36     Ca    8.8        09 Jun 2025 09:29  Ca    8.5        08 Jun 2025 18:31  Ca    8.3        08 Jun 2025 10:13  Mg     2.2       08 Jun 2025 10:13    TPro  7.0    /  Alb  3.3    /  TBili  1.7    /  DBili  x      /  AST  63     /  ALT  69     /  AlkPhos  51     09 Jun 2025 09:29  TPro  6.6    /  Alb  3.3    /  TBili  2.4    /  DBili  x      /  AST  119    /  ALT  105    /  AlkPhos  78     08 Jun 2025 10:13    PT/INR - ( 08 Jun 2025 10:13 )   PT: 11.5 sec;   INR: 0.97 ratio         PTT - ( 09 Jun 2025 09:29 )  PTT:72.2 sec        06-09 @ 09:29  TSH: 1.19

## 2025-06-13 ENCOUNTER — TRANSCRIPTION ENCOUNTER (OUTPATIENT)
Age: 45
End: 2025-06-13

## 2025-06-13 VITALS
DIASTOLIC BLOOD PRESSURE: 72 MMHG | OXYGEN SATURATION: 99 % | SYSTOLIC BLOOD PRESSURE: 113 MMHG | TEMPERATURE: 97 F | RESPIRATION RATE: 18 BRPM | HEART RATE: 99 BPM

## 2025-06-13 LAB
ANION GAP SERPL CALC-SCNC: 1 MMOL/L — LOW (ref 5–17)
BUN SERPL-MCNC: 13 MG/DL — SIGNIFICANT CHANGE UP (ref 7–23)
CALCIUM SERPL-MCNC: 9.1 MG/DL — SIGNIFICANT CHANGE UP (ref 8.5–10.1)
CHLORIDE SERPL-SCNC: 110 MMOL/L — HIGH (ref 96–108)
CO2 SERPL-SCNC: 29 MMOL/L — SIGNIFICANT CHANGE UP (ref 22–31)
CREAT SERPL-MCNC: 1.25 MG/DL — SIGNIFICANT CHANGE UP (ref 0.5–1.3)
EGFR: 72 ML/MIN/1.73M2 — SIGNIFICANT CHANGE UP
EGFR: 72 ML/MIN/1.73M2 — SIGNIFICANT CHANGE UP
GLUCOSE SERPL-MCNC: 132 MG/DL — HIGH (ref 70–99)
HCT VFR BLD CALC: 40 % — SIGNIFICANT CHANGE UP (ref 39–50)
HGB BLD-MCNC: 13.5 G/DL — SIGNIFICANT CHANGE UP (ref 13–17)
MAGNESIUM SERPL-MCNC: 2.5 MG/DL — SIGNIFICANT CHANGE UP (ref 1.6–2.6)
MCHC RBC-ENTMCNC: 33.8 G/DL — SIGNIFICANT CHANGE UP (ref 32–36)
MCHC RBC-ENTMCNC: 34.1 PG — HIGH (ref 27–34)
MCV RBC AUTO: 101 FL — HIGH (ref 80–100)
NRBC # BLD AUTO: 0 K/UL — SIGNIFICANT CHANGE UP (ref 0–0)
NRBC # FLD: 0 K/UL — SIGNIFICANT CHANGE UP (ref 0–0)
NRBC BLD AUTO-RTO: 0 /100 WBCS — SIGNIFICANT CHANGE UP (ref 0–0)
PHOSPHATE SERPL-MCNC: 4.6 MG/DL — HIGH (ref 2.5–4.5)
PLATELET # BLD AUTO: 132 K/UL — LOW (ref 150–400)
PMV BLD: 9.4 FL — SIGNIFICANT CHANGE UP (ref 7–13)
POTASSIUM SERPL-MCNC: 4.1 MMOL/L — SIGNIFICANT CHANGE UP (ref 3.5–5.3)
POTASSIUM SERPL-SCNC: 4.1 MMOL/L — SIGNIFICANT CHANGE UP (ref 3.5–5.3)
RBC # BLD: 3.96 M/UL — LOW (ref 4.2–5.8)
RBC # FLD: 14 % — SIGNIFICANT CHANGE UP (ref 10.3–14.5)
SODIUM SERPL-SCNC: 140 MMOL/L — SIGNIFICANT CHANGE UP (ref 135–145)
WBC # BLD: 5.09 K/UL — SIGNIFICANT CHANGE UP (ref 3.8–10.5)
WBC # FLD AUTO: 5.09 K/UL — SIGNIFICANT CHANGE UP (ref 3.8–10.5)

## 2025-06-13 PROCEDURE — 99233 SBSQ HOSP IP/OBS HIGH 50: CPT

## 2025-06-13 PROCEDURE — 99239 HOSP IP/OBS DSCHRG MGMT >30: CPT

## 2025-06-13 RX ORDER — APIXABAN 2.5 MG/1
1 TABLET, FILM COATED ORAL
Qty: 60 | Refills: 0
Start: 2025-06-13 | End: 2025-07-12

## 2025-06-13 RX ORDER — METOPROLOL SUCCINATE 50 MG/1
1 TABLET, EXTENDED RELEASE ORAL
Qty: 60 | Refills: 0
Start: 2025-06-13 | End: 2025-07-12

## 2025-06-13 RX ORDER — GABAPENTIN 400 MG/1
2 CAPSULE ORAL
Qty: 180 | Refills: 0
Start: 2025-06-13 | End: 2025-07-12

## 2025-06-13 RX ORDER — HYDROXYZINE HYDROCHLORIDE 25 MG/1
1 TABLET, FILM COATED ORAL
Qty: 40 | Refills: 0
Start: 2025-06-13 | End: 2025-06-22

## 2025-06-13 RX ORDER — METOPROLOL SUCCINATE 50 MG/1
2 TABLET, EXTENDED RELEASE ORAL
Qty: 120 | Refills: 0
Start: 2025-06-13 | End: 2025-07-12

## 2025-06-13 RX ORDER — METOPROLOL SUCCINATE 50 MG/1
100 TABLET, EXTENDED RELEASE ORAL
Refills: 0 | Status: DISCONTINUED | OUTPATIENT
Start: 2025-06-13 | End: 2025-06-13

## 2025-06-13 RX ORDER — CYST/ALA/Q10/PHOS.SER/DHA/BROC 100-20-50
1 POWDER (GRAM) ORAL
Qty: 0 | Refills: 0 | DISCHARGE
Start: 2025-06-13

## 2025-06-13 RX ORDER — METOPROLOL SUCCINATE 50 MG/1
1.5 TABLET, EXTENDED RELEASE ORAL
Refills: 0 | DISCHARGE

## 2025-06-13 RX ADMIN — HYDROXYZINE HYDROCHLORIDE 25 MILLIGRAM(S): 25 TABLET, FILM COATED ORAL at 05:43

## 2025-06-13 RX ADMIN — HYDROXYZINE HYDROCHLORIDE 25 MILLIGRAM(S): 25 TABLET, FILM COATED ORAL at 11:17

## 2025-06-13 RX ADMIN — GABAPENTIN 200 MILLIGRAM(S): 400 CAPSULE ORAL at 05:37

## 2025-06-13 RX ADMIN — Medication 15 MILLILITER(S): at 10:07

## 2025-06-13 RX ADMIN — METOPROLOL SUCCINATE 100 MILLIGRAM(S): 50 TABLET, EXTENDED RELEASE ORAL at 10:06

## 2025-06-13 RX ADMIN — APIXABAN 5 MILLIGRAM(S): 2.5 TABLET, FILM COATED ORAL at 10:06

## 2025-06-13 NOTE — PROGRESS NOTE ADULT - PROVIDER SPECIALTY LIST ADULT
Cardiology
Hospitalist
Intervent Cardiology
Hospitalist
Hospitalist
Cardiology
Hospitalist

## 2025-06-13 NOTE — DISCHARGE NOTE NURSING/CASE MANAGEMENT/SOCIAL WORK - PATIENT PORTAL LINK FT
You can access the FollowMyHealth Patient Portal offered by [facility] by registering at the following website: [email or web address]. By joining [facility]’s FollowMyHealth portal, you will also be able to view your health information using other applications (apps) compatible with our system.

## 2025-06-13 NOTE — PROGRESS NOTE ADULT - NS ATTEND AMEND GEN_ALL_CORE FT
Discussed above with NP -   46 yo male with a hx NICM, hypertension, alcohol use disorder presenting with substernal chest pain found to be in Afib with RVR in setting alcohol withdrawal.   Now s/p cath with nonobstructive disease  Afib RVR: rate controlled on metoprolol - transitioned to succinate  will plan to f/u outpt and consider NICKI/DCCV if still in Afib post withdrawal period.
Discussed above with NP   chest pain and Afib RVR in setting alcohol withdrawal   given risk factors for CAD and mildly reduced LVEF, will plan for cardiac cath when stable from withdrawal standpoint
Discussed above with NP -   46 yo male with a hx NICM, hypertension, alcohol use disorder presenting with substernal chest pain found to be in Afib with RVR in setting alcohol withdrawal.   Now s/p cath with nonobstructive disease  Afib RVR: rate controlled on metoprolol - transition to succinate on DC. will plan to f/u outpt and consider NICKI/DCCV if still in Afib post withdrawal period
Discussed above with NP   chest pain and Afib RVR in setting alcohol withdrawal   given risk factors for CAD and mildly reduced LVEF, will plan for cardiac cath when stable from withdrawal standpoint

## 2025-06-13 NOTE — DISCHARGE NOTE PROVIDER - PROVIDER TOKENS
PROVIDER:[TOKEN:[[id number]],FOLLOWUP:[1 week],ESTABLISHEDPATIENT:[T]],PROVIDER:[TOKEN:[[id number]],FOLLOWUP:[1 week],ESTABLISHEDPATIENT:[T]]

## 2025-06-13 NOTE — DISCHARGE NOTE PROVIDER - NSDCCAREPROVSEEN_GEN_ALL_CORE_FT
Nikole, Batsheva Florian, Saima Valdes, Clara Salomon, Neel Palla, Ravin Griffin, Rose Dominguez, Marcella Lowry, Aris Storey, Jimmy Lee, Sarah Lopez, Yamil Bautista, Donell

## 2025-06-13 NOTE — DISCHARGE NOTE PROVIDER - NSDCMRMEDTOKEN_GEN_ALL_CORE_FT
apixaban 5 mg oral tablet: 1 tab(s) orally every 12 hours  gabapentin 100 mg oral capsule: 2 cap(s) orally 3 times a day  hydrOXYzine hydrochloride 25 mg oral tablet: 1 tab(s) orally every 6 hours as needed for Anxiety  metoprolol tartrate 75 mg oral tablet: 2 tab(s) orally every 12 hours  multivitamin with minerals: 1 tab(s) orally once a day Utilize your choice of over the counter multi-vitamin   apixaban 5 mg oral tablet: 1 tab(s) orally every 12 hours  gabapentin 100 mg oral capsule: 2 cap(s) orally 3 times a day  hydrOXYzine hydrochloride 25 mg oral tablet: 1 tab(s) orally every 6 hours as needed for Anxiety  metoprolol succinate 100 mg oral tablet, extended release: 1 tab(s) orally every 12 hours  multivitamin with minerals: 1 tab(s) orally once a day Utilize your choice of over the counter multi-vitamin

## 2025-06-13 NOTE — DISCHARGE NOTE PROVIDER - NSDCCPCAREPLAN_GEN_ALL_CORE_FT
PRINCIPAL DISCHARGE DIAGNOSIS  Diagnosis: Alcohol withdrawal  Assessment and Plan of Treatment: Recommend complete cessation of Alcohol intake. Associated elevated liver enzymes expected to improve over time if alcohol is avoid. Continue abuse of alcohol, expected eventual progression to cirrhosis (irreversible liver damage which is not present as of yet). Get repeat liver functions tested in 2-3 weeks to reassess. May benefit from starting cholesterol medication for non-obstructive coronary artery disease but need improvement/stabalization of liver functions. Follow up with cardiology accordinlgy.      SECONDARY DISCHARGE DIAGNOSES  Diagnosis: New onset atrial fibrillation  Assessment and Plan of Treatment: Abnormal Heart Rythym leading to more inefficient blood flow at higher heart rates. Medications adjusted to control heart rate. Take medications as prescribed. In addition, with atrial fibrillation, increased risk of developing clots in the heart which can travel to the rest of the body and cause trouble (example is stroke). In order to prevent blood clots, you are on a blood thinner that helps prevent blood clot formation. Take medication as prescribed.   Being on a blood thinner will make cuts bleeding quicker/easier/longer and can cause bruising. Maintain prolonged pressure on any bleeding cuts. Caution and avoid trauma as major and minor bleeding can be potentiated by being on a blood thinner. If you notice any prolonged/persistent bleeding, seek medical attention.    Diagnosis: Hyponatremia  Assessment and Plan of Treatment: Low sodium concentration as a result of alcohol abuse. This has resolved. If sodium level gets too low, this can result in seizures, body process dysfunction(s) and even the possibility of coma. Continue cessation of alcohol intake. Maintain adequate (8 cups of water daily) oral hydration.    Diagnosis: Heart failure with mildly reduced ejection fraction  Assessment and Plan of Treatment: Normal heart pumps 55-65% of the blood in the main chamber of the heart (left ventricle) to the rest of the body. Your heart is pumping at 45-50%. We also have 4 valves in our heart that help blood move in one direction. Your valves show that you have "trace" leaky valves. WIth weak heart muscle you have have increased changes of fluid retention in the lungs (pulmonary edema) or in the soft tissues (ie lower extremity swelling).  For your heart failure:  Weigh yourself daily with the same scale. Any weight gain/loss greater than 2-3 pounds over 2 days or 5 pounds in a week, notify your cardiologist and primary medical doctor as it may indicate that your are retaining too much water or losing too much water. In which case, it may require dose adjustments to your diuretic(s).  Maintain low salt diet.  -Less than 2 Grams (2 Grams = 2000 milligrams) of daily salt intake. Too much salt intake can potentiate fluid retention.  Get repeat blood work to check your kidney function and electrolytes in 1 week with your cardiologist or primary medical doctor or nephrologist (if applicable)    Diagnosis: Hypertension  Assessment and Plan of Treatment: Blood pressure medications started/titrated for improved blood pressure control. Important to control blood pressure for long term stroke prevention and future cardiac issues.  Obstructive sleep apnea can also cause issues with blood pressure control and also potentiate the incidence of cardiac arrythmias in the future. If not done so already, recommend outpatient evaluation with a sleep study for obstructive sleep apnea.   Take blood pressure medications as prescribed.  Check blood pressure twice daily (make sure to use correct size blood pressure cuff).  Maintain a log to be reviewed by outpatient provider managing your blood pressure.  Maintain low sodium diet (less than 2000mg=2grams daily)

## 2025-06-13 NOTE — DISCHARGE NOTE PROVIDER - CARE PROVIDER_API CALL
Boxer, Jonathan A  Internal Medicine  [address] [place] [id number]  Phone: [phone]  Fax: [phone]  Established Patient  Follow Up Time: 1 week    Saima Florian  Cardiology  [address] [place] [id number]  Phone: [phone]  Fax: [phone]  Established Patient  Follow Up Time: 1 week

## 2025-06-13 NOTE — DISCHARGE NOTE NURSING/CASE MANAGEMENT/SOCIAL WORK - FINANCIAL ASSISTANCE
[facility] provides services at a reduced cost to those who are determined to be eligible through [facility]’s financial assistance program. Information regarding [facility]’s financial assistance program can be found by going to [email or web address] or by calling [phone].

## 2025-06-13 NOTE — PROGRESS NOTE ADULT - REASON FOR ADMISSION
chest discomfort

## 2025-06-13 NOTE — DISCHARGE NOTE PROVIDER - HOSPITAL COURSE
FROM H&P:    "46 yo M with pmh etoh abuse, HTN, cardiomyopathy, h/o etoh pancreatitis , chronic transaminitis p/w vague pleuritic chest discomfort and presented after he was experiencing withdrawal ssx. Consumed large amounts of etoh during the last 2 weeks (last drink 2 days ago) and at risk for impending w/d - denies h/o DTs. Scored 8 on arrival, s/p valium, now a 2.   Pt found to be in new onset rapid afib with rates in 140s -> s/p 1L NS, rates in 90s. Heparin gtt started.     Serum Na: 124  Serum K: 3.1  Scr: 1.4  Vitals stable  Denies neurological deficits.   Started on thiamine, MVI and potassium repleted"    6/9 - Pt feels fine, no Chest pain or SOB, HR up to 130s at times   6/10: CIWA ~8. HR improved. Cardiology recs. Continue CIWA and librium taper.   6/11: CIWA improved. HR improved. Psych consult/recs for assistance in anxiety control without increasing benzo use. Ischemic work up in 1-2 days if Withdrawl component remains stable.   6/12: Ciwa stable. Off taper. DC CIWA protocol. s/p LHC with non-obstructive CAD. Heparin ggt to eliquis tonight. DIscharge planning tentatively tomorrow with cardiology/HF recs in place.   6/13: CLinically stable. Cleared by cardiology. Cardiology recommending maintaining lopressor bid and HF team recommending toprol XL BID + entresto. In the setting of present stabalizAtion of vitals, HR and presentation, continue lopressor and follow up closely outpatient    #New onset afib with rvr (volume depletion suspected + EtOH abuse); Suspected to be paroxysmal  #Cardiomyopathy; Chronic HFmrEF (EF 45-50%)  #r/o CAD  - tele & EKG w a fib  - c/w metoprolol 150mg BID (home med)  - IVF  - monitor lytes  - CHADS VASC: 2 (HTN, cardiomyopathy) > heparin drip for now, possible cath-> s/p LHC (6/12)-Non-obstructive CAD  - cardio consult appreciated  - monitor lytes      #Hyponatremia/Hypokalemia. Resolved  - suspect 2/2 alcohol use, poor PO intake/HYpovolemia  - goal Na correction 6-8 meq in 24 hours  - replete PRN       #Correction to prior diagnosis. No rosales POA  - 1.3 now 1.1 s/p IVF      #HTN with cardiomyopathy (EF unknown)  - TTE  - c/w metoprolol  - cardio consult,     #chronic transaminitis seen on outside records on HIE dating back to 2019, no labs since then  - prior MRI in 2019 identified hepatic steatosis  - obtain RUQ sono->Hepatosplenomegaly  - GI consult as o/p    #thrombocytopenia. Likely from liver dysfunction as a result of EtOH abuse.   - Last labs outpatient 2017 show flucuating platelets from normal to low 130-140  - 147 on admission now 93 since initiating heparin (<50% drop)  - HIT score 3 points : low probability of HIT  - suspect thrombytopenia is chronic to some extent given alcohol history   - no sign/sx of clots currently   - will send Heparin AB/reflex       #ETOH withdrawal  #etoh abuse  - continue ciwa scale/diazepam taper  - mvi, thiamine, folate    T(C): 36.7 (06-13-25 @ 05:40), Max: 36.7 (06-12-25 @ 11:25)  HR: 76 (06-13-25 @ 05:40) (60 - 96)  BP: 113/72 (06-13-25 @ 05:40) (96/72 - 114/84)  RR: 18 (06-13-25 @ 05:40) (16 - 18)  SpO2: 98% (06-13-25 @ 05:40) (96% - 98%)  AAOx3; NAD; Obese  No JVF  RRR, no murmur  Lungs CTA bilaterally; Normal respriatory effort  Abdomen benign  Neuro: non-focal  Psych: Anxiety +; denies suicidal ideation or intent to hurt self  Discharge Management: 37 minutes  Date of Discharge/Service: 6/13/2025 FROM H&P:    "46 yo M with pmh etoh abuse, HTN, cardiomyopathy, h/o etoh pancreatitis , chronic transaminitis p/w vague pleuritic chest discomfort and presented after he was experiencing withdrawal ssx. Consumed large amounts of etoh during the last 2 weeks (last drink 2 days ago) and at risk for impending w/d - denies h/o DTs. Scored 8 on arrival, s/p valium, now a 2.   Pt found to be in new onset rapid afib with rates in 140s -> s/p 1L NS, rates in 90s. Heparin gtt started.     Serum Na: 124  Serum K: 3.1  Scr: 1.4  Vitals stable  Denies neurological deficits.   Started on thiamine, MVI and potassium repleted"    6/9 - Pt feels fine, no Chest pain or SOB, HR up to 130s at times   6/10: CIWA ~8. HR improved. Cardiology recs. Continue CIWA and librium taper.   6/11: CIWA improved. HR improved. Psych consult/recs for assistance in anxiety control without increasing benzo use. Ischemic work up in 1-2 days if Withdrawl component remains stable.   6/12: Ciwa stable. Off taper. DC CIWA protocol. s/p LHC with non-obstructive CAD. Heparin ggt to eliquis tonight. DIscharge planning tentatively tomorrow with cardiology/HF recs in place.   6/13: CLinically stable. Cleared by cardiology. Lopressor to toprol as per Card/HF team. Blood pressure on lower side. Entresto initiation deferred to outpatient evaluation.    #New onset afib with rvr (volume depletion suspected + EtOH abuse); Suspected to be paroxysmal  #Cardiomyopathy; Chronic HFmrEF (EF 45-50%)  #r/o CAD  - tele & EKG w a fib  - c/w metoprolol 150mg BID (home med)->toprol (6/13)  - IVF  - monitor lytes  - CHADS VASC: 2 (HTN, cardiomyopathy) > heparin drip for now, possible cath-> s/p LHC (6/12)-Non-obstructive CAD  - cardio consult appreciated  - monitor lytes      #Hyponatremia/Hypokalemia. Resolved  - suspect 2/2 alcohol use, poor PO intake/HYpovolemia  - goal Na correction 6-8 meq in 24 hours  - replete PRN       #Correction to prior diagnosis. No rosales POA  - 1.3 now 1.1 s/p IVF      #HTN with cardiomyopathy (EF unknown)  - TTE  - c/w metoprolol  - cardio consult,     #chronic transaminitis seen on outside records on HIE dating back to 2019, no labs since then  - prior MRI in 2019 identified hepatic steatosis  - obtain RUQ sono->Hepatosplenomegaly  - GI consult as o/p    #thrombocytopenia. Likely from liver dysfunction as a result of EtOH abuse.   - Last labs outpatient 2017 show flucuating platelets from normal to low 130-140  - 147 on admission now 93 since initiating heparin (<50% drop)  - HIT score 3 points : low probability of HIT  - suspect thrombytopenia is chronic to some extent given alcohol history   - no sign/sx of clots currently   - will send Heparin AB/reflex       #ETOH withdrawal  #etoh abuse  - continue ciwa scale/diazepam taper  - mvi, thiamine, folate    T(C): 36.7 (06-13-25 @ 05:40), Max: 36.7 (06-12-25 @ 11:25)  HR: 76 (06-13-25 @ 05:40) (60 - 96)  BP: 113/72 (06-13-25 @ 05:40) (96/72 - 114/84)  RR: 18 (06-13-25 @ 05:40) (16 - 18)  SpO2: 98% (06-13-25 @ 05:40) (96% - 98%)  AAOx3; NAD; Obese  No JVF  RRR, no murmur  Lungs CTA bilaterally; Normal respriatory effort  Abdomen benign  Neuro: non-focal  Psych: Anxiety +; denies suicidal ideation or intent to hurt self  Discharge Management: 37 minutes  Date of Discharge/Service: 6/13/2025

## 2025-06-13 NOTE — PROGRESS NOTE ADULT - SUBJECTIVE AND OBJECTIVE BOX
CHIEF COMPLAINT: chest pain       HPI:  46 yo male with a hx unspecified cardiomyopathy, hypertension, alcohol use disorder presenting with substernal chest pain. Pt reports feeling like he was experiencing alcohol withdrawal sx after 1-2 weeks of binge drinking (at least one bottle of wine daily). He has been hospitalized for withdrawal before but denies hx seizures. c/o  intermittent palpitations for several months to years and recent exertional dyspnea.     [facility]. reports remote hx catheterization - does not recall being told he had coronary disease.   Has not followed with cardiologists recently.      Family hx notable for father with CAD s/p CABG age 50s   personal hx tobacco use, recent intermittent vaping  occasional cannabis use. +daily alcohol at least one bottle wine daily     Noted to have Afib with RVR for which cardiology was consulted.     6/10/25: Pt denies cp or sob.  Feels jittery.  Medicated with diazepam for anxiety this am.  Tele: Afib with 's-150's sustained  6/11/25: Seen today no CP/SOB still with mild tremors" (ETOH/anxiety) Tele AF HR improved over the past 24 HRS (has brief Non sustained rapid rates with activity )  6/12/25:  Pt seen earlier this am laying in bed in NAD.  States he is feeling better today.   Denies tremors, CP, SOB, palpitations, lightheadedness.  Tele Afib 60-90s (was rapid Afib until 7:30pm yesterday, remained rate controlled since then, also had 6 sec HR 44 at 5:30am)    6/13/25: Pt without cardiac complaints. Wants to go home today. Tele: RSR Afib 60's-70's, bradys down into the 40's overnight.      PAST MEDICAL/SURGICAL/FAMILY/SOCIAL HISTORY:   Asthma childhood - pt states does not have aymore  HTN (hypertension)   Pancreatitis due to common bile duct stone January 2017  Pneumonia 1 month ago.     PAST SURGICAL HISTORY:  S/P right knee arthroscopy 2 in 2003  1 in 2004.     FAMILY HISTORY:  No pertinent family history in first degree relatives.    SOCIAL HISTORY  Former smoker  +ETOH    FAMILY HISTORY:   No pertinent family history in first degree relatives    Allergies  No Known Allergies      MEDICATIONS  Home Medications:  amLODIPine 10 mg oral tablet: 1 tab(s) orally once a day   hydroCHLOROthiazide 25 mg oral tablet: 1 tab(s) orally once a day   Metoprolol Tartrate 100 mg oral tablet: 1.5 tab(s) orally 2 times a day    MEDICATIONS  (STANDING):  apixaban 5 milliGRAM(s) Oral every 12 hours  gabapentin 200 milliGRAM(s) Oral three times a day  metoprolol succinate  milliGRAM(s) Oral two times a day  multivitamin/minerals/iron Oral Solution (CENTRUM) 15 milliLiter(s) Oral daily  sodium chloride 0.9%. 1000 milliLiter(s) (50 mL/Hr) IV Continuous <Continuous>  sodium chloride 0.9%. 1000 milliLiter(s) (100 mL/Hr) IV Continuous <Continuous>    MEDICATIONS  (PRN):  acetaminophen     Tablet .. 650 milliGRAM(s) Oral every 6 hours PRN Mild Pain (1 - 3)  hydrOXYzine hydrochloride 25 milliGRAM(s) Oral every 6 hours PRN Anxiety  ondansetron Injectable 4 milliGRAM(s) IV Push every 6 hours PRN Nausea and/or Vomiting    Vital Signs Last 24 Hrs  T(C): 36.3 (13 Jun 2025 08:48), Max: 36.7 (12 Jun 2025 11:25)  T(F): 97.4 (13 Jun 2025 08:48), Max: 98.1 (12 Jun 2025 11:25)  HR: 99 (13 Jun 2025 08:48) (60 - 99)  BP: 113/72 (13 Jun 2025 08:48) (96/72 - 114/84)  BP(mean): --  RR: 18 (13 Jun 2025 08:48) (16 - 18)  SpO2: 99% (13 Jun 2025 08:48) (96% - 99%)    Parameters below as of 13 Jun 2025 08:48  Patient On (Oxygen Delivery Method): room air        PHYSICAL EXAM:  Constitutional: NAD, awake and alert  HEENT:  EOMI,  Pupils round, No oral cyanosis.  Pulmonary: Non-labored, breath sounds are clear bilaterally  Cardiovascular: Irregular  Gastrointestinal: Abd soft, nontender.   Lymph: No peripheral edema.  Neurological: Alert, no focal deficits      LABS:                          13.5   5.09  )-----------( 132      ( 13 Jun 2025 05:32 )             40.0                           13.6   5.67  )-----------( 117      ( 12 Jun 2025 05:09 )             40.4                         15.3   8.96  )-----------( 87       ( 10 Tello 2025 05:12 )             43.3                         17.1   10.90 )-----------( 94       ( 09 Jun 2025 09:29 )             47.2                         17.4   13.94 )-----------( 124      ( 08 Jun 2025 18:31 )             47.9                         17.7   11.16 )-----------( 147      ( 08 Jun 2025 10:13 )             46.9     06-13    140  |  110[H]  |  13  ----------------------------<  132[H]  4.1   |  29  |  1.25    Ca    9.1      13 Jun 2025 05:32  Phos  4.6     06-13  Mg     2.5     06-13      06-12    136  |  105  |  15  ----------------------------<  105[H]  3.5   |  26  |  1.10    Ca    9.0      12 Jun 2025 05:09  Phos  2.9     06-11  Mg     2.7     06-11    TPro  6.6  /  Alb  2.8[L]  /  TBili  0.8  /  DBili  x   /  AST  32  /  ALT  43  /  AlkPhos  49  06-11    06-10    134[L]  |  100  |  17  ----------------------------<  101[H]  3.4[L]   |  29  |  1.33[H]    Ca    8.6      10 Tello 2025 05:12  Mg     2.2     06-08    TPro  7.0  /  Alb  3.3  /  TBili  1.7[H]  /  DBili  x   /  AST  63[H]  /  ALT  69  /  AlkPhos  51  06-09    09 Jun 2025 09:29    128    |  94     |  10     ----------------------------<  104    3.3     |  27     |  1.10   08 Jun 2025 18:31    126    |  89     |  12     ----------------------------<  118    3.5     |  31     |  1.21   08 Jun 2025 10:13    124    |  85     |  16     ----------------------------<  149    3.1     |  30     |  1.36     Ca    8.8        09 Jun 2025 09:29  Ca    8.5        08 Jun 2025 18:31  Ca    8.3        08 Jun 2025 10:13  Mg     2.2       08 Jun 2025 10:13    TPro  7.0    /  Alb  3.3    /  TBili  1.7    /  DBili  x      /  AST  63     /  ALT  69     /  AlkPhos  51     09 Jun 2025 09:29  TPro  6.6    /  Alb  3.3    /  TBili  2.4    /  DBili  x      /  AST  119    /  ALT  105    /  AlkPhos  78     08 Jun 2025 10:13    PT/INR - ( 08 Jun 2025 10:13 )   PT: 11.5 sec;   INR: 0.97 ratio      PTT - ( 09 Jun 2025 09:29 )  PTT:72.2 sec    06-09 @ 09:29  TSH: 1.19    trop (6/8) 13.5--> (6/9) 6.0  BNP (6/8) 134      EKG / CARDIAC TESTING  < from: 12 Lead ECG (06.08.25 @ 09:38) >  Diagnosis Line Atrial fibrillation with rapid ventricular response  Abnormal ECG  < end of copied text >    < from: TTE W or WO Ultrasound Enhancing Agent (06.08.25 @ 13:22) >   1. The left ventricular cavity is mildly dilated. Left ventricular systolic function is mildly decreased with an ejection fraction estimated at 45 to 50 %.   2. Normal right ventricular size and function.   3. Left atrium is mildly dilated.  Aortic Valve:  The aortic valve appears trileaflet with normal systolic excursion. There is no aortic valve stenosis. The peak transaortic velocity is 1.29 m/s and peak transaortic gradient is 6.7 mmHg. There is trace aortic regurgitation.  Mitral Valve:  Structurally normal mitral valve with normal leaflet excursion. There is no calcification of the mitral valve annulus. There is no mitral valve stenosis. There is trace mitral regurgitation.  Tricuspid Valve:  There is no evidence of tricuspid stenosis. There is no evidence of tricuspid regurgitation. There is no echocardiographic evidence of pulmonary hypertension.  Pulmonic Valve:  Structurally normal pulmonic valve with normal leaflet excursion. There is no pulmonic valve stenosis. There is trace pulmonic regurgitation.  Aorta:  The aortic arch is not well visualized. The aortic root at the sinuses of Valsalva is normal in size, measuring 3.30 cm (indexed 1.43 cm/m²). The ascending aorta is normal in size, measuring 3.51 cm (indexed 1.53 cm/m²).  Pericardium:  No pericardial effusion seen.  < end of copied text     RADIOLOGY  < from: Xray Chest 1 View- PORTABLE-Urgent (06.08.25 @ 10:48) >  The cardiomediastinal silhouette is normal and the joe are not enlarged.   The trachea is midline. There is no focal lung consolidation or sizable   pleural effusion. No significant osseous abnormality.  IMPRESSION:  Unremarkable frontal chest x ray  < end of copied text >    < from: US Abdomen Upper Quadrant Right (06.08.25 @ 18:20) >  1. Mild hepatosplenomegaly with diffuse increased echogenicity, likely   due to hepatic steatosis.  2. Gallbladder sludge without evidence of cholecystitis.  < end of copied text >    < from: Cardiac Catheterization (06.12.25 @ 12:03) >  Diagnostic Conclusions:     -Normal LVEDP   -Mildly reduced LVEF   -Mild non-obstructive CAD   Recommendations:     -Medical Therapy   -Consider cardioversion     < end of copied text >

## 2025-06-13 NOTE — DISCHARGE NOTE PROVIDER - CARE PROVIDERS DIRECT ADDRESSES
[email or web address]

## 2025-06-13 NOTE — PROGRESS NOTE ADULT - ASSESSMENT
44 yo male with a hx unspecified cardiomyopathy, hypertension, alcohol use disorder presenting with substernal chest pain. Admitted for alcohol withdrawal with course c/b new Afib with RVR    Chest pain   Currently CP free, troponin negative, EKG no Ischemic ST changes, Echo with diminished LV FX of unclear chronicity, CP possibly secondary to Afib with RVR.    -  Pt S/P LHC, nonobstructive CAD, Mildly reduced LVF.  - Transition to metoprolol succinate on DC    New Afib with RVR   -  likely due to alcohol withdrawal.   -  HR improved  -  Echo shows Depressed LV FX, LA mildly dilated  -  TSH normal  -  Recommend OPT sleep study   -  CW BB and Eliquis  -  maintain K>4, Mg>2  -  Plan OP followup and NICKI/DCCV if still in afib    Cardiomyopathy  -  Pt believes he was told of a [facility] who he has not been [facility]   -  appears clinically compensated, , CXR Unremarkable  -  Pt S/P LHC as above  -  CW BB.  Plan on adding ACE/ARB OP after cardioversion.  -  Would avoid CCB/HCTZ for reduced LV function/HTN    HTN   as above - controlled at this time   outpatient cardiologist: none.  Pt to follow up with  in one week OP

## 2025-06-20 ENCOUNTER — NON-APPOINTMENT (OUTPATIENT)
Age: 45
End: 2025-06-20

## 2025-06-20 ENCOUNTER — APPOINTMENT (OUTPATIENT)
Dept: CARDIOLOGY | Facility: CLINIC | Age: 45
End: 2025-06-20

## 2025-06-20 VITALS
DIASTOLIC BLOOD PRESSURE: 82 MMHG | OXYGEN SATURATION: 97 % | BODY MASS INDEX: 29.42 KG/M2 | HEIGHT: 73 IN | HEART RATE: 87 BPM | SYSTOLIC BLOOD PRESSURE: 121 MMHG | WEIGHT: 222 LBS

## 2025-06-20 DIAGNOSIS — E87.6 HYPOKALEMIA: ICD-10-CM

## 2025-06-20 DIAGNOSIS — E87.1 HYPO-OSMOLALITY AND HYPONATREMIA: ICD-10-CM

## 2025-06-20 DIAGNOSIS — I11.0 HYPERTENSIVE HEART DISEASE WITH HEART FAILURE: ICD-10-CM

## 2025-06-20 DIAGNOSIS — I25.119 ATHEROSCLEROTIC HEART DISEASE OF NATIVE CORONARY ARTERY WITH UNSPECIFIED ANGINA PECTORIS: ICD-10-CM

## 2025-06-20 DIAGNOSIS — F10.239 ALCOHOL DEPENDENCE WITH WITHDRAWAL, UNSPECIFIED: ICD-10-CM
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
## 2025-06-20 DIAGNOSIS — I48.0 PAROXYSMAL ATRIAL FIBRILLATION: ICD-10-CM

## 2025-06-20 DIAGNOSIS — I42.8 OTHER CARDIOMYOPATHIES: ICD-10-CM

## 2025-06-20 DIAGNOSIS — R74.01 ELEVATION OF LEVELS OF LIVER TRANSAMINASE LEVELS: ICD-10-CM

## 2025-06-20 DIAGNOSIS — D69.6 THROMBOCYTOPENIA, UNSPECIFIED: ICD-10-CM

## 2025-06-20 DIAGNOSIS — Z87.891 PERSONAL HISTORY OF NICOTINE DEPENDENCE: ICD-10-CM

## 2025-06-20 DIAGNOSIS — Z82.49 FAMILY HISTORY OF ISCHEMIC HEART DISEASE AND OTHER DISEASES OF THE CIRCULATORY SYSTEM: ICD-10-CM

## 2025-06-20 DIAGNOSIS — I50.22 CHRONIC SYSTOLIC (CONGESTIVE) HEART FAILURE: ICD-10-CM

## 2025-06-20 DIAGNOSIS — F10.139 ALCOHOL ABUSE WITH WITHDRAWAL, UNSPECIFIED: ICD-10-CM

## 2025-06-20 DIAGNOSIS — F41.9 ANXIETY DISORDER, UNSPECIFIED: ICD-10-CM

## 2025-06-20 PROCEDURE — G2211 COMPLEX E/M VISIT ADD ON: CPT

## 2025-06-20 PROCEDURE — 99214 OFFICE O/P EST MOD 30 MIN: CPT

## 2025-06-20 PROCEDURE — 93000 ELECTROCARDIOGRAM COMPLETE: CPT

## 2025-06-20 RX ORDER — APIXABAN 5 MG/1
5 TABLET, FILM COATED ORAL
Qty: 90 | Refills: 3 | Status: ACTIVE | COMMUNITY
Start: 2025-06-20

## 2025-06-20 RX ORDER — HYDROXYZINE HYDROCHLORIDE 50 MG/1
50 TABLET ORAL TWICE DAILY
Qty: 120 | Refills: 0 | Status: ACTIVE | COMMUNITY
Start: 2025-06-20

## 2025-06-26 RX ORDER — METOPROLOL SUCCINATE 100 MG/1
100 TABLET, EXTENDED RELEASE ORAL
Refills: 0 | Status: ACTIVE | COMMUNITY

## 2025-06-26 RX ORDER — GABAPENTIN 300 MG/1
300 CAPSULE ORAL
Refills: 0 | Status: ACTIVE | COMMUNITY

## 2025-06-30 ENCOUNTER — OUTPATIENT (OUTPATIENT)
Dept: OUTPATIENT SERVICES | Facility: HOSPITAL | Age: 45
LOS: 1 days | End: 2025-06-30
Payer: OTHER GOVERNMENT

## 2025-06-30 DIAGNOSIS — G47.33 OBSTRUCTIVE SLEEP APNEA (ADULT) (PEDIATRIC): ICD-10-CM

## 2025-06-30 DIAGNOSIS — Z98.890 OTHER SPECIFIED POSTPROCEDURAL STATES: Chronic | ICD-10-CM

## 2025-06-30 PROCEDURE — 95800 SLP STDY UNATTENDED: CPT | Mod: 26

## 2025-06-30 PROCEDURE — 95800 SLP STDY UNATTENDED: CPT

## 2025-07-07 LAB
ANION GAP SERPL CALC-SCNC: 13 MMOL/L
BUN SERPL-MCNC: 8 MG/DL
CALCIUM SERPL-MCNC: 9.9 MG/DL
CHLORIDE SERPL-SCNC: 100 MMOL/L
CO2 SERPL-SCNC: 24 MMOL/L
CREAT SERPL-MCNC: 1.3 MG/DL
EGFRCR SERPLBLD CKD-EPI 2021: 69 ML/MIN/1.73M2
GLUCOSE SERPL-MCNC: 87 MG/DL
HCT VFR BLD CALC: 47.9 %
HGB BLD-MCNC: 15.8 G/DL
MCHC RBC-ENTMCNC: 33 G/DL
MCHC RBC-ENTMCNC: 33.1 PG
MCV RBC AUTO: 100.4 FL
PLATELET # BLD AUTO: 202 K/UL
POTASSIUM SERPL-SCNC: 4.3 MMOL/L
RBC # BLD: 4.77 M/UL
RBC # FLD: 13.6 %
SODIUM SERPL-SCNC: 137 MMOL/L
WBC # FLD AUTO: 6.77 K/UL

## 2025-07-10 NOTE — ASU PATIENT PROFILE, ADULT - FALL HARM RISK - UNIVERSAL INTERVENTIONS
Bed in lowest position, wheels locked, appropriate side rails in place/Call bell, personal items and telephone in reach/Instruct patient to call for assistance before getting out of bed or chair/Non-slip footwear when patient is out of bed/Trade to call system/Physically safe environment - no spills, clutter or unnecessary equipment/Purposeful Proactive Rounding/Room/bathroom lighting operational, light cord in reach

## 2025-07-10 NOTE — ASU PATIENT PROFILE, ADULT - NSICDXPASTMEDICALHX_GEN_ALL_CORE_FT
PAST MEDICAL HISTORY:  Asthma childhood - pt states does not have aymore    HTN (hypertension)     Pancreatitis due to common bile duct stone January 2017    Pneumonia 1 month ago    Unspecified atrial fibrillation

## 2025-07-11 ENCOUNTER — RESULT REVIEW (OUTPATIENT)
Age: 45
End: 2025-07-11

## 2025-07-11 ENCOUNTER — OUTPATIENT (OUTPATIENT)
Dept: OUTPATIENT SERVICES | Facility: HOSPITAL | Age: 45
LOS: 1 days | Discharge: HOME | End: 2025-07-11
Payer: OTHER GOVERNMENT

## 2025-07-11 VITALS
HEIGHT: 73 IN | HEART RATE: 85 BPM | TEMPERATURE: 98 F | WEIGHT: 223.99 LBS | RESPIRATION RATE: 16 BRPM | SYSTOLIC BLOOD PRESSURE: 116 MMHG | DIASTOLIC BLOOD PRESSURE: 74 MMHG | OXYGEN SATURATION: 100 %

## 2025-07-11 VITALS
HEART RATE: 70 BPM | DIASTOLIC BLOOD PRESSURE: 74 MMHG | SYSTOLIC BLOOD PRESSURE: 107 MMHG | OXYGEN SATURATION: 99 % | RESPIRATION RATE: 15 BRPM

## 2025-07-11 DIAGNOSIS — I48.91 UNSPECIFIED ATRIAL FIBRILLATION: ICD-10-CM

## 2025-07-11 DIAGNOSIS — Z98.890 OTHER SPECIFIED POSTPROCEDURAL STATES: Chronic | ICD-10-CM

## 2025-07-11 PROCEDURE — 93320 DOPPLER ECHO COMPLETE: CPT | Mod: 26

## 2025-07-11 PROCEDURE — 93010 ELECTROCARDIOGRAM REPORT: CPT

## 2025-07-11 PROCEDURE — 92960 CARDIOVERSION ELECTRIC EXT: CPT

## 2025-07-11 PROCEDURE — 93312 ECHO TRANSESOPHAGEAL: CPT | Mod: 26

## 2025-07-11 PROCEDURE — 33285 INSJ SUBQ CAR RHYTHM MNTR: CPT

## 2025-07-11 PROCEDURE — 93325 DOPPLER ECHO COLOR FLOW MAPG: CPT

## 2025-07-11 PROCEDURE — 93320 DOPPLER ECHO COMPLETE: CPT

## 2025-07-11 PROCEDURE — 93005 ELECTROCARDIOGRAM TRACING: CPT

## 2025-07-11 PROCEDURE — 93312 ECHO TRANSESOPHAGEAL: CPT

## 2025-07-11 PROCEDURE — 93325 DOPPLER ECHO COLOR FLOW MAPG: CPT | Mod: 26

## 2025-07-11 NOTE — CHART NOTE - NSCHARTNOTEFT_GEN_A_CORE
PROCEDURE NOTE:   NICKI/DCCV   INDICATION: New Afib     Pt tolerated procedure well without complications. Underwent NICKI with no evidence of thrombus then subsequent cardioversion with successful conversion to NSR with 200J x1.   See echo report for all findings.

## 2025-07-11 NOTE — PACU DISCHARGE NOTE - COMMENTS
Patient with nahid/dccv performed by Dr. Florian. Probe reba 0908, probe out 0916, cardioversion performed with 200jx1 at 0917, successful conversion to nsr, confirmed by 12 lead ekg. Patient in no acute distress, returned to baseline eliz score without incident. VSS, ROS as charted, remains as previously assessed. Patient given post procedure and discharge instructions by RN, patient verbalized understanding with no further questions. All possessions with patient, confirmed with patient, patient escorted to lobby via wheelchair with transport aide, safety maintained at all times. Spoke and confirmed with patients wife, Cammy that she will be picking patient up.

## 2025-07-11 NOTE — ASU PREOP CHECKLIST - WAS PATIENT ON BETA BLOCKER?
Message left for patient to call the office. Per Dr. Delia Boykin patient must be off Eloquis for 3 days prior to her colonoscopy. Yes

## 2025-07-11 NOTE — PROCEDURAL SAFETY CHECKLIST WITH OR WITHOUT SEDATION - NSPOSTCOMMENTFT_GEN_ALL_CORE
patient with nahid and dccv performed by Dr. Florian, probe inserted 0908,, probe out  0916. Patient cardioverted with 200j x1 at 0917, conversion to NSR noted, confirmed by 12 lead EKG. Patient recovered by anesthesia, hand off from md to RN performed.

## 2025-07-17 ENCOUNTER — TRANSCRIPTION ENCOUNTER (OUTPATIENT)
Age: 45
End: 2025-07-17

## 2025-07-17 DIAGNOSIS — I48.91 UNSPECIFIED ATRIAL FIBRILLATION: ICD-10-CM

## 2025-07-18 ENCOUNTER — APPOINTMENT (OUTPATIENT)
Dept: CARDIOLOGY | Facility: CLINIC | Age: 45
End: 2025-07-18

## 2025-07-18 VITALS
WEIGHT: 225 LBS | OXYGEN SATURATION: 96 % | SYSTOLIC BLOOD PRESSURE: 128 MMHG | HEART RATE: 88 BPM | DIASTOLIC BLOOD PRESSURE: 80 MMHG | HEIGHT: 73 IN | BODY MASS INDEX: 29.82 KG/M2

## 2025-07-18 PROCEDURE — 93000 ELECTROCARDIOGRAM COMPLETE: CPT

## 2025-07-18 PROCEDURE — 99214 OFFICE O/P EST MOD 30 MIN: CPT

## 2025-07-18 PROCEDURE — G2211 COMPLEX E/M VISIT ADD ON: CPT

## 2025-07-21 ENCOUNTER — APPOINTMENT (OUTPATIENT)
Dept: ORTHOPEDIC SURGERY | Facility: CLINIC | Age: 45
End: 2025-07-21
Payer: OTHER GOVERNMENT

## 2025-07-21 VITALS — HEIGHT: 73 IN | WEIGHT: 225 LBS | BODY MASS INDEX: 29.82 KG/M2

## 2025-07-21 DIAGNOSIS — Z86.79 PERSONAL HISTORY OF OTHER DISEASES OF THE CIRCULATORY SYSTEM: ICD-10-CM

## 2025-07-21 DIAGNOSIS — Z78.9 OTHER SPECIFIED HEALTH STATUS: ICD-10-CM

## 2025-07-21 PROCEDURE — 99203 OFFICE O/P NEW LOW 30 MIN: CPT | Mod: 25

## 2025-07-21 PROCEDURE — 73560 X-RAY EXAM OF KNEE 1 OR 2: CPT | Mod: 50

## 2025-07-21 PROCEDURE — 20610 DRAIN/INJ JOINT/BURSA W/O US: CPT | Mod: RT

## 2025-07-22 ENCOUNTER — APPOINTMENT (OUTPATIENT)
Dept: ORTHOPEDIC SURGERY | Facility: CLINIC | Age: 45
End: 2025-07-22

## 2025-07-23 PROBLEM — Z78.9 NON-SMOKER: Status: ACTIVE | Noted: 2025-07-23

## 2025-07-23 PROBLEM — Z86.79 HISTORY OF HYPERTENSION: Status: RESOLVED | Noted: 2025-07-23 | Resolved: 2025-07-23

## 2025-07-23 PROBLEM — Z86.79 HISTORY OF CARDIAC DISORDER: Status: RESOLVED | Noted: 2025-07-23 | Resolved: 2025-07-23

## 2025-07-28 DIAGNOSIS — Z87.39 PERSONAL HISTORY OF OTHER DISEASES OF THE MUSCULOSKELETAL SYSTEM AND CONNECTIVE TISSUE: ICD-10-CM

## 2025-07-31 ENCOUNTER — APPOINTMENT (OUTPATIENT)
Dept: PHYSICAL MEDICINE AND REHAB | Facility: CLINIC | Age: 45
End: 2025-07-31
Payer: OTHER GOVERNMENT

## 2025-07-31 VITALS
HEART RATE: 82 BPM | RESPIRATION RATE: 15 BRPM | BODY MASS INDEX: 29.82 KG/M2 | SYSTOLIC BLOOD PRESSURE: 138 MMHG | WEIGHT: 225 LBS | HEIGHT: 73 IN | DIASTOLIC BLOOD PRESSURE: 84 MMHG | OXYGEN SATURATION: 96 %

## 2025-07-31 PROCEDURE — G2211 COMPLEX E/M VISIT ADD ON: CPT

## 2025-07-31 PROCEDURE — 99204 OFFICE O/P NEW MOD 45 MIN: CPT | Mod: GC

## 2025-07-31 RX ORDER — METHOCARBAMOL 500 MG/1
500 TABLET, FILM COATED ORAL
Qty: 60 | Refills: 1 | Status: ACTIVE | COMMUNITY
Start: 2025-07-31 | End: 1900-01-01

## 2025-08-04 ENCOUNTER — APPOINTMENT (OUTPATIENT)
Dept: ORTHOPEDIC SURGERY | Facility: CLINIC | Age: 45
End: 2025-08-04
Payer: OTHER GOVERNMENT

## 2025-08-04 VITALS — WEIGHT: 225 LBS | HEIGHT: 73 IN | BODY MASS INDEX: 29.82 KG/M2

## 2025-08-04 DIAGNOSIS — M17.0 BILATERAL PRIMARY OSTEOARTHRITIS OF KNEE: ICD-10-CM

## 2025-08-04 PROCEDURE — 99213 OFFICE O/P EST LOW 20 MIN: CPT

## 2025-08-05 PROBLEM — M17.0 BILATERAL PRIMARY OSTEOARTHRITIS OF KNEE: Status: ACTIVE | Noted: 2025-07-21

## 2025-08-14 ENCOUNTER — RESULT REVIEW (OUTPATIENT)
Age: 45
End: 2025-08-14

## 2025-08-14 ENCOUNTER — OUTPATIENT (OUTPATIENT)
Dept: OUTPATIENT SERVICES | Facility: HOSPITAL | Age: 45
LOS: 1 days | End: 2025-08-14
Payer: OTHER GOVERNMENT

## 2025-08-14 ENCOUNTER — APPOINTMENT (OUTPATIENT)
Dept: MRI IMAGING | Facility: CLINIC | Age: 45
End: 2025-08-14
Payer: OTHER GOVERNMENT

## 2025-08-14 DIAGNOSIS — M47.816 SPONDYLOSIS WITHOUT MYELOPATHY OR RADICULOPATHY, LUMBAR REGION: ICD-10-CM

## 2025-08-14 DIAGNOSIS — Z98.890 OTHER SPECIFIED POSTPROCEDURAL STATES: Chronic | ICD-10-CM

## 2025-08-14 PROCEDURE — 72148 MRI LUMBAR SPINE W/O DYE: CPT

## 2025-08-14 PROCEDURE — 70360 X-RAY EXAM OF NECK: CPT | Mod: 26

## 2025-08-14 PROCEDURE — 70360 X-RAY EXAM OF NECK: CPT

## 2025-08-14 PROCEDURE — 72148 MRI LUMBAR SPINE W/O DYE: CPT | Mod: 26

## 2025-08-18 ENCOUNTER — APPOINTMENT (OUTPATIENT)
Dept: PHYSICAL MEDICINE AND REHAB | Facility: CLINIC | Age: 45
End: 2025-08-18
Payer: OTHER GOVERNMENT

## 2025-08-18 DIAGNOSIS — M47.816 SPONDYLOSIS W/OUT MYELOPATHY OR RADICULOPATHY, LUMBAR REGION: ICD-10-CM

## 2025-08-18 DIAGNOSIS — I48.91 UNSPECIFIED ATRIAL FIBRILLATION: ICD-10-CM

## 2025-08-18 PROCEDURE — G2211 COMPLEX E/M VISIT ADD ON: CPT | Mod: 93

## 2025-08-18 PROCEDURE — 99214 OFFICE O/P EST MOD 30 MIN: CPT | Mod: 93

## 2025-08-18 RX ORDER — CYCLOBENZAPRINE 10 MG/1
10 TABLET ORAL TWICE DAILY
Qty: 60 | Refills: 1 | Status: ACTIVE | COMMUNITY
Start: 2025-08-18 | End: 1900-01-01

## 2025-08-19 ENCOUNTER — NON-APPOINTMENT (OUTPATIENT)
Age: 45
End: 2025-08-19

## 2025-09-04 ENCOUNTER — APPOINTMENT (OUTPATIENT)
Dept: PHYSICAL MEDICINE AND REHAB | Facility: AMBULATORY SURGERY CENTER | Age: 45
End: 2025-09-04

## 2025-09-17 ENCOUNTER — NON-APPOINTMENT (OUTPATIENT)
Age: 45
End: 2025-09-17

## 2025-09-18 ENCOUNTER — APPOINTMENT (OUTPATIENT)
Dept: PHYSICAL MEDICINE AND REHAB | Facility: AMBULATORY SURGERY CENTER | Age: 45
End: 2025-09-18